# Patient Record
Sex: FEMALE | Race: WHITE | NOT HISPANIC OR LATINO | Employment: FULL TIME | URBAN - METROPOLITAN AREA
[De-identification: names, ages, dates, MRNs, and addresses within clinical notes are randomized per-mention and may not be internally consistent; named-entity substitution may affect disease eponyms.]

---

## 2018-03-16 ENCOUNTER — OFFICE VISIT (OUTPATIENT)
Dept: OBSTETRICS AND GYNECOLOGY | Facility: CLINIC | Age: 28
End: 2018-03-16

## 2018-03-16 VITALS
RESPIRATION RATE: 14 BRPM | SYSTOLIC BLOOD PRESSURE: 114 MMHG | WEIGHT: 191 LBS | BODY MASS INDEX: 30.83 KG/M2 | DIASTOLIC BLOOD PRESSURE: 72 MMHG

## 2018-03-16 DIAGNOSIS — Z01.419 WELL WOMAN EXAM WITH ROUTINE GYNECOLOGICAL EXAM: Primary | ICD-10-CM

## 2018-03-16 PROCEDURE — 99395 PREV VISIT EST AGE 18-39: CPT | Performed by: OBSTETRICS & GYNECOLOGY

## 2018-03-16 NOTE — PROGRESS NOTES
Subjective   Chief Complaint   Patient presents with   • Gynecologic Exam     Nunu Wells is a 27 y.o. year old  presenting to be seen for her annual exam.     SEXUAL Hx:  She is currently sexually active.  In the past year there there has been NO new sexual partners.    Condoms are never used.  She would not like to be screened for STD's at today's exam.  Current birth control method: not using any form of contraception because she is currently trying to conceive.  Been at it for ~ 13 months w/o success  MENSTRUAL Hx:  Patient's last menstrual period was 2018 (approximate).  In the past 6 months her cycles have been regular, predictable and occur monthly.  Her menstrual flow is typically normal.   Each month on average there are roughly 0 day(s) of very heavy flow.    Intermenstrual bleeding is absent.    Post-coital bleeding is absent.  Dysmenorrhea: is not affecting her activities of daily living  PMS: is not affecting her activities of daily living  Her cycles are not a source of concern for her that she wishes to discuss today.  HEALTH Hx:  She exercises regularly: no (and has no plans to become more active).  She wears her seat belt: yes.  She has concerns about domestic violence: no.  OTHER THINGS SHE WANTS TO DISCUSS TODAY:  Nothing else    The following portions of the patient's history were reviewed and updated as appropriate:problem list, current medications, allergies, past family history, past medical history, past social history and past surgical history.    Smoking status: Never Smoker                                                                 Smokeless tobacco: Not on file                       Review of Systems  Constitutional POS: nothing reported    NEG: anorexia or night sweats   Genitourinary POS: nothing reported    NEG: dysuria or hematuria      Gastointestinal POS: nothing reported    NEG: bloating, change in bowel habits, melena or reflux symptoms   Integument POS:  nothing reported    NEG: moles that are changing in size, shape, color or rashes   Breast POS: nothing reported    NEG: persistent breast lump, skin dimpling or nipple discharge        Objective   /72   Resp 14   Wt 86.6 kg (191 lb)   LMP 02/27/2018 (Approximate)   Breastfeeding? No   BMI 30.83 kg/m²     General:  well developed; well nourished  no acute distress   Skin:  No suspicious lesions seen   Thyroid: normal to inspection and palpation   Breasts:  Examined in supine position  Symmetric without masses or skin dimpling  Nipples normal without inversion, lesions or discharge  There are no palpable axillary nodes   Abdomen: soft, non-tender; no masses  no umbilical or inginual hernias are present  no hepato-splenomegaly   Pelvis: Clinical staff was present for exam  External genitalia:  normal appearance of the external genitalia including Bartholin's and [place]'s glands.  :  urethral meatus normal;  Vaginal:  normal pink mucosa without prolapse or lesions.  Cervix:  normal appearance.  Uterus:  normal size, shape and consistency.  Adnexa:  normal bimanual exam of the adnexa.  Rectal:  digital rectal exam not performed; anus visually normal appearing.        Assessment   1. Normal GYN exam  2. Pre-conceptional - she currently is taking sufficient folic acid  3. Primary infertility - She appears ovulatory by history today     Plan   1. Pap was not done today.  I explained to Nunu that the recommendations for Pap smear interval in a low risk patient has lengthened to 3 years time.  I told Nunu she still needs to be seen in our office yearly for a full physical including breast and pelvic exam.  2. I've encouraged Nunu to look into her health insurance coverage for infertility before determining whether to proceed with testing.  I reviewed with her the testing protocol that we would pursue if she wishes.  We would start initially with sperm count testing along with laboratory assessment of  ovulation.  If these tests were normal, would then consider proceeding with hysterosalpingogram and if this also was normal 5 lid proceeding to laparoscopy to evaluate for endometriosis.  3. No prescription was given or electronically sent at today's visit  4. The importance of keeping all planned follow-up and taking all medications as prescribed was emphasized.  5. Follow up for annual exam or PRN         This note was electronically signed.    Marco Dobbins M.D.  March 16, 2018    Note: Speech recognition transcription software may have been used to create portions of this document.  An attempt at proofreading has been made but errors in transcription could still be present.

## 2019-09-24 NOTE — PROGRESS NOTES
Subjective   Chief Complaint   Patient presents with   • Gynecologic Exam     Nunu Wells is a 29 y.o. year old  presenting to be seen for her annual exam.     SEXUAL Hx:  She is currently sexually active.  In the past year there there has been NO new sexual partners.    Condoms are never used.  She would not like to be screened for STD's at today's exam.  Current birth control method: not using any form of contraception because she is currently trying to conceive.  She is happy with her current method of contraception and does not want to discuss alternative methods of contraception.  MENSTRUAL Hx:  Patient's last menstrual period was 2019 (approximate).  In the past 6 months her cycles have been regular, predictable and occur monthly.  Her menstrual flow is typically normal.   Each month on average there are roughly 0 day(s) of very heavy flow.    Intermenstrual bleeding is absent.    Post-coital bleeding is absent.  Dysmenorrhea: is not affecting her activities of daily living  PMS: is not affecting her activities of daily living  Her cycles are not a source of concern for her that she wishes to discuss today.  HEALTH Hx:  She exercises regularly: no (and has no plans to become more active).  She wears her seat belt: yes.  She has concerns about domestic violence: no.  OTHER THINGS SHE WANTS TO DISCUSS TODAY:  Nothing else    The following portions of the patient's history were reviewed and updated as appropriate:problem list, current medications, allergies, past family history, past medical history, past social history and past surgical history.    Social History    Tobacco Use      Smoking status: Never Smoker      Smokeless tobacco: Never Used    Review of Systems  Constitutional POS: nothing reported    NEG: anorexia or night sweats   Genitourinary POS: nothing reported    NEG: dysuria or hematuria      Gastointestinal POS: nothing reported    NEG: bloating, change in bowel habits, melena or  reflux symptoms   Integument POS: nothing reported    NEG: moles that are changing in size, shape, color or rashes   Breast POS: nothing reported    NEG: persistent breast lump, skin dimpling or nipple discharge        Objective   /72   Resp 14   Wt 81.6 kg (180 lb)   LMP 08/27/2019 (Approximate)   Breastfeeding? No   BMI 29.05 kg/m²     General:  well developed; well nourished  no acute distress   Skin:  No suspicious lesions seen   Thyroid: normal to inspection and palpation   Breasts:  Examined in supine position  Symmetric without masses or skin dimpling  Nipples normal without inversion, lesions or discharge  There are no palpable axillary nodes  Ecchymosis and bruising along with hematomas present in the right breast consistent with recent surgery   Abdomen: soft, non-tender; no masses  no umbilical or inguinal hernias are present  no hepato-splenomegaly   Pelvis: Clinical staff was present for exam  External genitalia:  normal appearance of the external genitalia including Bartholin's and [place]'s glands.  :  urethral meatus normal;  Vaginal:  normal pink mucosa without prolapse or lesions.  Cervix:  normal appearance.  Uterus:  normal size, shape and consistency.  Adnexa:  normal bimanual exam of the adnexa.  Rectal:  digital rectal exam not performed; anus visually normal appearing.        Assessment   1. Normal GYN exam  2. Family history of breast cancer with negative genetic testing from maternal side.  Paternal side has not been worked up  3. Recent history of ductal hyperplasia.  Pathology reports not available.  If truly has atypical ductal hyperplasia confirmed by pathology this increases her risk to greater than 20%.  If that is the case she will need to have high risk protocol independent of genetic testing report  4. Pre-conceptional - she currently is taking sufficient folic acid     Plan   1. Pap was done today.  If she does not receive the results of the Pap within 2 weeks  time,  she was instructed to call to find out the results.  I explained to Nunu that the recommendations for Pap smear interval in a low risk patient's has lengthened to 3 years time.  I encouraged her to be seen yearly for a full physical exam including breast and pelvic exam even during the off years when PAP's will not be performed.  2. Set her up for genetic counseling here at [facility].  Have strongly encouraged her to get a copy of her pathology reports from her biopsies along with her mother's genetic testing results in advance of seeing the counselors at [facility]  3. The following data needs to be obtained to update her medical records: pathology from her breast biopsy.  4. The importance of keeping all planned follow-up and taking all medications as prescribed was emphasized.  5. Follow up after sees genetic counselors          This note was electronically signed.    Marco Dobbins M.D.  September 25, 2019    Note: Speech recognition transcription software may have been used to create portions of this document.  An attempt at proofreading has been made but errors in transcription could still be present.

## 2019-09-25 ENCOUNTER — OFFICE VISIT (OUTPATIENT)
Dept: OBSTETRICS AND GYNECOLOGY | Facility: CLINIC | Age: 29
End: 2019-09-25

## 2019-09-25 VITALS
BODY MASS INDEX: 29.05 KG/M2 | WEIGHT: 180 LBS | SYSTOLIC BLOOD PRESSURE: 118 MMHG | DIASTOLIC BLOOD PRESSURE: 72 MMHG | RESPIRATION RATE: 14 BRPM

## 2019-09-25 DIAGNOSIS — N60.91 ATYPICAL DUCTAL HYPERPLASIA OF RIGHT BREAST: ICD-10-CM

## 2019-09-25 DIAGNOSIS — Z01.419 WELL WOMAN EXAM WITH ROUTINE GYNECOLOGICAL EXAM: Primary | ICD-10-CM

## 2019-09-25 DIAGNOSIS — Z80.3 FAMILY HISTORY OF BREAST CANCER: ICD-10-CM

## 2019-09-25 PROCEDURE — 99395 PREV VISIT EST AGE 18-39: CPT | Performed by: OBSTETRICS & GYNECOLOGY

## 2019-10-28 ENCOUNTER — CLINICAL SUPPORT (OUTPATIENT)
Dept: GENETICS | Facility: HOSPITAL | Age: 29
End: 2019-10-28

## 2019-10-28 ENCOUNTER — CLINICAL SUPPORT (OUTPATIENT)
Dept: ONCOLOGY | Facility: CLINIC | Age: 29
End: 2019-10-28

## 2019-10-28 DIAGNOSIS — Z80.3 FAMILY HISTORY OF BREAST CANCER: Primary | ICD-10-CM

## 2019-10-28 DIAGNOSIS — Z80.41 FAMILY HISTORY OF OVARIAN CANCER: ICD-10-CM

## 2019-10-28 NOTE — PROGRESS NOTES
TELEMEDICINE    INDICATION:  Patient was seen for genetic counseling via telemedicine by Ni Escamilla [place], [facility] [place] history of [place] and ovarian [place].  Genetic counselor reviewed family history in detail, obtaining a three generation pedigree.   See pedigree and summary of genetic counseling session in genetic counselor’s visit documentation.      PLAN:  Genetic testing was sent for CANCER NEXT panel.  Results expected in three weeks.  Genetic counselor will contact patient with results at that time.      I reviewed recommendations with patient and genetic counselor at the conclusion of the visit.  I answered all remaining questions, and encouraged patient to contact genetic counselor if there are any further questions or concern.      Electronically Signed by: LISE Tierney , October 28, 2019 12:56 PM

## 2019-10-30 NOTE — PROGRESS NOTES
Nunu Wells, a 29-year-old female, was referred for genetic counseling due to a family history of breast cancer and ovarian cancer.  She reports having regular annual breast exams and annual mammograms.  She was 13 years old at menarche and is nulliparous.  She is premenopausal and retains her uterus and ovaries.  She was interested in discussing her risk for a hereditary cancer syndrome.  Ms. Wells was interested in pursuing comprehensive genetic testing to evaluate her risk of cancer, therefore the CancerNext panel was ordered through [employer], which analyzes 34 genes associated with an increased cancer risk. The genes on this panel include APC, KEYSHA, BARD1, BMPR1A, BRCA1, BRCA2, BRIP1, CDH1, CDK4, CDKN2A, CHEK2, DICER1, EPCAM, GREM1, HOXB13, MLH1, MRE11A, MSH2, MSH6, MUTYH, NBN, NF1, PALB2, PMS2, POLD1, POLE, PTEN, RAD50, RAD51C, RAD51D, SMAD4, SMARCA4, STK11, and TP53. Results are expected in approximately 2-3 weeks.     PERTINENT FAMILY HISTORY: (See attached pedigree)   Mat. Grandmother: Ovarian cancer, 71     Breast cancer  Mat. Great-Aunts (x3): Breast cancer  Mother:   negative BRCA1/2 - no panel completed  Pat. Grandmother: Ovarian cancer  Pat. Great-grandmother: Breast cancer     We do not have medical records regarding any of these diagnoses.      RISK ASSESSMENT:  Ms. Wells’s family history of breast cancer and ovarian cancer raised the question of a hereditary cancer syndrome. Ms. Wells clearly meets NCCN guidelines criteria for BRCA1/2 testing, on both sides of the family, based on having a close relative diagnosed with ovarian cancer.  When affected relatives are unavailable to pursue genetic testing, it is reasonable to offer to an unaffected individual.  Based on the presence of other clinically significant breast cancer related genes, the standard approach to hereditary cancer genetic testing at this time is via multi-gene panel, which evaluates for BRCA1/2 as well as several additional  genes associated with a hereditary risk for breast cancer and other cancers. If genetic testing is negative, management should be guided by a family history-based risk assessment.    GENETIC COUNSELING:  We reviewed the family history information in detail. Cases of cancer follow three general patterns: sporadic, familial, and hereditary.  While most cancer is sporadic, some cases appear to occur in family clusters.  These cases are said to be familial and account for 10-20% of cancer cases.  Familial cases may be due to a combination of shared genes and environmental factors among family members.  In even fewer families, the cancer is said to be inherited, and the genes responsible for the cancer are known.      Family histories typical of hereditary cancer syndromes usually include multiple first- and second-degree relatives diagnosed with cancer types that define a syndrome.  These cases tend to be diagnosed at younger-than-expected ages and can be bilateral or multifocal.  The cancer in these families follows an autosomal dominant inheritance pattern, which indicates the likely presence of a mutation in a cancer susceptibility gene.  Children and siblings of an individual believed to carry this mutation have a 50% chance of inheriting that mutation, thereby inheriting the increased risk to develop cancer.  These mutations can be passed down from the maternal or the paternal lineage.    Hereditary breast cancer accounts for 5-10% of all cases of breast cancer.  A significant proportion (50%) of hereditary breast cancer can be attributed to mutations in the BRCA1 and BRCA2 genes.  Mutations in these genes confer an increased risk for breast cancer, ovarian cancer, male breast cancer, prostate cancer, and pancreatic cancer.  Women with a BRCA1 or BRCA2 mutation have up to an 87% lifetime risk of breast cancer and up to a 44% risk of ovarian cancer.  There are other clinically significant breast cancer related genes  in addition to BRCA1/2, including PALB2, KEYSHA, and CHEK2. There are other, more rare, hereditary cancer syndromes. Based on Ms. Wells’s family history and her desire to get more information regarding her personal risks, she opted to pursue testing through a panel evaluating several other genes known to increase the risk for cancer.    GENETIC TESTING:  The risks, benefits, and limitations of genetic testing and implications for clinical management following testing were reviewed.  DNA test results can influence decisions regarding screening and prevention.  Genetic testing can have significant psychological implications for both individuals and families. Also discussed was the possibility of employment and insurance discrimination based on genetic test results and the laws in place to prevent this, as well as the limitations of these laws.      We discussed panel testing, which would involve testing 34 genes associated with increased cancer risk. The implications of a positive or negative test result were discussed.  We also discussed the importance of testing on an affected relative. In general, a negative genetic test result is most informative if a mutation has first been established in an affected member of the family.  In cases where an affected individual is not available or interested in testing, it is appropriate to offer testing to an unaffected individual. We discussed the possibility that, in some cases, genetic test results may be ambiguous due to the identification of a genetic variant. These variants may or may not be associated with an increased cancer risk. With multigene panel testing, it is not uncommon for a variant of uncertain significance (VUS) to be identified.  If a VUS is identified, testing family members is typically not recommended and screening recommendations are made based on the family history.  The laboratories that perform genetic testing work to reclassify the VUS and send out an  amended report if and when a VUS is reclassified.  The majority of variant findings are ultimately reclassified to a negative result. Given her family history, a negative test result does not eliminate all cancer risk, although the risk would not be as high as it would with positive genetic testing. We also discussed that some of the genes on this particular panel have not been well studied yet and there may not be clear implications or guidelines for some of the genes included on this comprehensive panel.    PLAN: Genetic testing via the CancerNext panel through [employer] was ordered and results are expected in 2-3 weeks.  [employer] will preauthorize the testing with Ms. Wells’s insurance and will then contact her if there is an out of pocket cost.  In most cases, testing is covered by insurance when the patient meets NCCN guidelines criteria.   We will contact Ms. Wells with her results once they are received.     Ni Escamilla MS, [facility] [place] [facility]  Licensed Certified Genetic Counselor

## 2019-11-25 ENCOUNTER — DOCUMENTATION (OUTPATIENT)
Dept: GENETICS | Facility: HOSPITAL | Age: 29
End: 2019-11-25

## 2019-11-25 PROBLEM — Z80.3 FAMILY HISTORY OF BREAST CANCER: Status: ACTIVE | Noted: 2019-11-25

## 2019-11-25 NOTE — PROGRESS NOTES
Nunu Wells, a 29-year-old female, was referred for genetic counseling due to a family history of breast cancer and ovarian cancer.  She reports having regular annual breast exams and has had mammograms in the past.  She had a breast biopsy recently, which identified usual ductal hyperplasia (path report available for review), no known history of atypical ductal hyperplasia.  She was 13 years old at menarche and is nulliparous.  She is premenopausal and retains her uterus and ovaries.  She was interested in discussing her risk for a hereditary cancer syndrome.  Ms. Wells was interested in pursuing comprehensive genetic testing to evaluate her risk of cancer, therefore the CancerNext panel was ordered through [facility], which analyzes 34 genes associated with an increased cancer risk. The genes on this panel include APC, KEYSHA, BARD1, BMPR1A, BRCA1, BRCA2, BRIP1, CDH1, CDK4, CDKN2A, CHEK2, DICER1, EPCAM, GREM1, HOXB13, MLH1, MRE11A, MSH2, MSH6, MUTYH, NBN, NF1, PALB2, PMS2, POLD1, POLE, PTEN, RAD50, RAD51C, RAD51D, SMAD4, SMARCA4, STK11, and TP53. Genetic testing was negative for pathogenic mutations in BRCA1/2 and 32 additional genes included on the CancerNext panel. These normal results were discussed with Ms. Wells by telephone on 11/25/19.    PERTINENT FAMILY HISTORY: (See attached pedigree)   Mat. Grandmother:  Possible Ovarian cancer, 71      Breast cancer  Mat. Great-Aunts (x3) : Breast cancer  Mother:   negative BRCA1/2 - no panel completed  Pat. Grandmother:  Ovarian cancer  Pat. Great-grandmother:  Breast cancer     We do not have medical records regarding any of these diagnoses.      RISK ASSESSMENT:  Ms. Wells’s family history of breast cancer and ovarian cancer raised the question of a hereditary cancer syndrome. Ms. Wells clearly meets NCCN guidelines criteria for BRCA1/2 testing, on both sides of the family, based on having a close relative diagnosed with ovarian cancer.  When affected  relatives are unavailable to pursue genetic testing, it is reasonable to offer to an unaffected individual.  Based on the presence of other clinically significant breast cancer related genes, the standard approach to hereditary cancer genetic testing at this time is via multi-gene panel, which evaluates for BRCA1/2 as well as several additional genes associated with a hereditary risk for breast cancer and other cancers. If genetic testing is negative, management should be guided by a family history-based risk assessment.    Because genetic testing was negative, Ms. Wells’s management should be guided by a family history-based risk assessment at this time. Simon, is able to take into account personal factors (age at menarche, age at first birth, menopausal status, hormone replacement, etc) and family history when calculating risk for breast cancer.  Computer modeling estimates that Ms. Wells’s lifetime personal risk for developing breast cancer is up to 16.6% (Simno, v8), in comparison to the general population risk of 12.5.  Her ten-year risk is estimated to be 0.6%, elevated above the average risk of 0.4%. Per NCCN guidelines, a lifetime risk above 20% is considered to be “high risk” where increased screening is warranted; Ms. Wells’s risk does not fall into that category. This risk assessment is based on the information provided at the time of the appointment and could change in the future should new information be obtained.    GENETIC COUNSELING:  We reviewed the family history information in detail. Cases of cancer follow three general patterns: sporadic, familial, and hereditary.  While most cancer is sporadic, some cases appear to occur in family clusters.  These cases are said to be familial and account for 10-20% of cancer cases.  Familial cases may be due to a combination of shared genes and environmental factors among family members.  In even fewer families, the cancer is said to be  inherited, and the genes responsible for the cancer are known.      Family histories typical of hereditary cancer syndromes usually include multiple first- and second-degree relatives diagnosed with cancer types that define a syndrome.  These cases tend to be diagnosed at younger-than-expected ages and can be bilateral or multifocal.  The cancer in these families follows an autosomal dominant inheritance pattern, which indicates the likely presence of a mutation in a cancer susceptibility gene.  Children and siblings of an individual believed to carry this mutation have a 50% chance of inheriting that mutation, thereby inheriting the increased risk to develop cancer.  These mutations can be passed down from the maternal or the paternal lineage.    Hereditary breast cancer accounts for 5-10% of all cases of breast cancer.  A significant proportion (50%) of hereditary breast cancer can be attributed to mutations in the BRCA1 and BRCA2 genes.  Mutations in these genes confer an increased risk for breast cancer, ovarian cancer, male breast cancer, prostate cancer, and pancreatic cancer.  Women with a BRCA1 or BRCA2 mutation have up to an 87% lifetime risk of breast cancer and up to a 44% risk of ovarian cancer.  There are other clinically significant breast cancer related genes in addition to BRCA1/2, including PALB2, KEYSHA, and CHEK2. There are other, more rare, hereditary cancer syndromes. Based on Ms. Wells’s family history and her desire to get more information regarding her personal risks, she opted to pursue testing through a panel evaluating several other genes known to increase the risk for cancer.    GENETIC TESTING:  The risks, benefits, and limitations of genetic testing and implications for clinical management following testing were reviewed.  DNA test results can influence decisions regarding screening and prevention.  Genetic testing can have significant psychological implications for both individuals and  families. Also discussed was the possibility of employment and insurance discrimination based on genetic test results and the laws in place to prevent this, as well as the limitations of these laws.      We discussed panel testing, which would involve testing 34 genes associated with increased cancer risk. The implications of a positive or negative test result were discussed.  We also discussed the importance of testing on an affected relative. In general, a negative genetic test result is most informative if a mutation has first been established in an affected member of the family.  In cases where an affected individual is not available or interested in testing, it is appropriate to offer testing to an unaffected individual. We discussed the possibility that, in some cases, genetic test results may be ambiguous due to the identification of a genetic variant. These variants may or may not be associated with an increased cancer risk. With multigene panel testing, it is not uncommon for a variant of uncertain significance (VUS) to be identified.  Given her family history, a negative test result does not eliminate all cancer risk, although the risk would not be as high as it would with positive genetic testing.     TEST RESULTS: Genetic testing was negative for known pathogenic mutations by sequencing and rearrangement testing for the genes on the CancerNext panel (see attached results).  This negative result greatly lowers the risk of a hereditary cancer syndrome for Ms. Wells.  It is possible that the family history is due to a hereditary cancer syndrome that Ms. Wells did not happen to inherit. If a relative of Ms. Wells were to have testing and was found to carry a mutation in a gene included on this panel in the future, then her risk assessment would need to be updated. This assessment is based on the information provided at the time of the consultation.    CANCER PREVENTION:  Options available to individuals  with an elevated lifetime risk for breast and/or ovarian cancer were briefly discussed, including increased surveillance, chemoprevention and prophylactic surgery (mastectomy and/or oophorectomy).  Based on computer modeling, Ms. Wells’s lifetime risk for breast cancer would not be considered “high risk”. Per NCCN guidelines, it is appropriate for her to follow general population screening guidelines for her breast cancer risk, including self-breast exams, annual clinical breast exam, and annual mammography starting at age 40.     PLAN:  Genetic counseling remains available to Ms. Wells. If she has any questions or concerns, she is welcome to contact us at [phone].    Ni Escamilla MS, [facility] [place]  Licensed Certified Genetic Counselor    Cc: MD Nunu Stephen

## 2022-05-03 ENCOUNTER — OFFICE VISIT (OUTPATIENT)
Dept: OBSTETRICS AND GYNECOLOGY | Facility: CLINIC | Age: 32
End: 2022-05-03

## 2022-05-03 VITALS
SYSTOLIC BLOOD PRESSURE: 116 MMHG | BODY MASS INDEX: 31.8 KG/M2 | WEIGHT: 197 LBS | RESPIRATION RATE: 14 BRPM | DIASTOLIC BLOOD PRESSURE: 76 MMHG

## 2022-05-03 DIAGNOSIS — Z71.85 VACCINE COUNSELING: ICD-10-CM

## 2022-05-03 DIAGNOSIS — Z01.419 WELL WOMAN EXAM WITH ROUTINE GYNECOLOGICAL EXAM: Primary | ICD-10-CM

## 2022-05-03 DIAGNOSIS — Z80.3 FAMILY HISTORY OF BREAST CANCER: ICD-10-CM

## 2022-05-03 PROCEDURE — 99395 PREV VISIT EST AGE 18-39: CPT | Performed by: OBSTETRICS & GYNECOLOGY

## 2022-05-04 ENCOUNTER — TELEPHONE (OUTPATIENT)
Dept: OBSTETRICS AND GYNECOLOGY | Facility: CLINIC | Age: 32
End: 2022-05-04

## 2022-05-04 DIAGNOSIS — N97.9 FEMALE INFERTILITY: Primary | ICD-10-CM

## 2022-05-04 NOTE — TELEPHONE ENCOUNTER
I placed orders for labs.  These labs need to be done specifically at a time of her menstrual cycle.  If day 1 is the first day of bleeding, these test need to be done around day 21.  Preferably this should also be done fasting first thing in the morning.    Also please give her the phone number for Dr. Ean Garibay.  His phone number is [phone].  Her spouse needs to call him to get a semen analysis done.

## 2022-05-04 NOTE — TELEPHONE ENCOUNTER
Mu    Pt was seen on 5/3/22. Pt called insurance to get info on fertility coverage states she was notified they cover testing but not treatments. Pt wants orders put in for Fertility testing.

## 2022-05-04 NOTE — TELEPHONE ENCOUNTER
Spoke with Nunu advising her of new ordered lab work for pt and her spouse.  Pt lives 2+ hours from [place] so orders have been faxed to   [phone]  [facility] for pt convenience. Pt voiced understanding of instructions.

## 2022-05-06 LAB — REF LAB TEST METHOD: NORMAL

## 2022-05-31 ENCOUNTER — TELEPHONE (OUTPATIENT)
Dept: OBSTETRICS AND GYNECOLOGY | Facility: CLINIC | Age: 32
End: 2022-05-31

## 2022-05-31 NOTE — TELEPHONE ENCOUNTER
Dr. Dobbins' patient   [phone] returned patient's call, recording: your call cannot be completed at this time.   [phone] patient called back and I advised her her blood work is not back yet. Patient states she has her results in her [facility] portal. I advised patient to fax or upload her results to her [facility] if she can. Patient is going to fax them over to [phone].

## 2022-06-01 ENCOUNTER — DOCUMENTATION (OUTPATIENT)
Dept: OBSTETRICS AND GYNECOLOGY | Facility: CLINIC | Age: 32
End: 2022-06-01

## 2022-06-01 NOTE — PROGRESS NOTES
Outside lab reviewed    May 30, 2022  · TSH = 1.521  · Prolactin = 18.86  · Progesterone = 4.94      Marco Dobbins M.D.  June 1, 2022  08:54 EDT

## 2022-12-27 ENCOUNTER — TELEPHONE (OUTPATIENT)
Dept: OBSTETRICS AND GYNECOLOGY | Facility: CLINIC | Age: 32
End: 2022-12-27

## 2022-12-27 NOTE — TELEPHONE ENCOUNTER
PAUL HARP    [phone]    PT IS 8wks HAS NOT STARTED OB CARE     WAS SEEN 12/24/22 [facility] ER FOR BLEEDING     PLS GIVE PT A CALL ON F/U

## 2022-12-27 NOTE — TELEPHONE ENCOUNTER
Called patient and informed of Dr. Dobbins' advisement.  She verified understanding.  Offered her NOB with US on 1/9, she opted to wait for 1/12 so that her  could come as well.  She will let us know if bleeding worsens or changes.  She confirmed that currently spotting is very light.

## 2022-12-27 NOTE — TELEPHONE ENCOUNTER
If her bleeding is light no emergency to be seen.  Would be great to get her in sometime within the next 2 weeks for a follow-up ultrasound.  If the bleeding becomes heavy she does need to be seen more urgently.

## 2022-12-27 NOTE — TELEPHONE ENCOUNTER
"Called and spoke with patient.  Patient states that started having lighter bleeding on Sunday December 18th and went to an ED (not ).  She states that she most recently had some more moderate/flow of bleeding on 12/24, with this she went to ED in [place], [place].  She states that at this visit they said it was due to a subchorionic hemorrhage and administered a Rhogam shot and instructed her to see Dr. Dobbins.  Patient also states that she has a Bicornuate Uterus and that the pregnancy is in the left side.  She states that since her 12/24 ED visit, the bleeding/spotting has been light and on and off but has not stopped.  Patient is having records from most recent ED visit faxed over to us (I provided fax number).  Routing this to physician to see in what time frame he would like us to get patient scheduled within if prior to first technical \"first available\" NOB and US.   "

## 2022-12-28 PROCEDURE — 99284 EMERGENCY DEPT VISIT MOD MDM: CPT

## 2022-12-29 ENCOUNTER — HOSPITAL ENCOUNTER (EMERGENCY)
Facility: HOSPITAL | Age: 32
Discharge: HOME | End: 2022-12-29
Attending: STUDENT IN AN ORGANIZED HEALTH CARE EDUCATION/TRAINING PROGRAM | Admitting: STUDENT IN AN ORGANIZED HEALTH CARE EDUCATION/TRAINING PROGRAM
Payer: COMMERCIAL

## 2022-12-29 ENCOUNTER — APPOINTMENT (OUTPATIENT)
Dept: ULTRASOUND IMAGING | Facility: HOSPITAL | Age: 32
End: 2022-12-29
Payer: COMMERCIAL

## 2022-12-29 VITALS
DIASTOLIC BLOOD PRESSURE: 71 MMHG | TEMPERATURE: 98.1 F | RESPIRATION RATE: 16 BRPM | OXYGEN SATURATION: 97 % | HEIGHT: 66 IN | WEIGHT: 203 LBS | BODY MASS INDEX: 32.62 KG/M2 | SYSTOLIC BLOOD PRESSURE: 123 MMHG | HEART RATE: 94 BPM

## 2022-12-29 DIAGNOSIS — O46.8X1 SUBCHORIONIC HEMATOMA IN FIRST TRIMESTER, SINGLE OR UNSPECIFIED FETUS: Primary | ICD-10-CM

## 2022-12-29 DIAGNOSIS — N28.89 PELVIECTASIS OF KIDNEY: ICD-10-CM

## 2022-12-29 DIAGNOSIS — M79.18 PAIN OF PARASPINAL MUSCLE: ICD-10-CM

## 2022-12-29 DIAGNOSIS — O41.8X10 SUBCHORIONIC HEMATOMA IN FIRST TRIMESTER, SINGLE OR UNSPECIFIED FETUS: Primary | ICD-10-CM

## 2022-12-29 DIAGNOSIS — N39.0 ACUTE UTI (URINARY TRACT INFECTION): ICD-10-CM

## 2022-12-29 DIAGNOSIS — M54.6 ACUTE RIGHT-SIDED THORACIC BACK PAIN: ICD-10-CM

## 2022-12-29 LAB
ABO GROUP BLD: NORMAL
ALBUMIN SERPL-MCNC: 4 G/DL (ref 3.5–5.2)
ALBUMIN/GLOB SERPL: 1.3 G/DL
ALP SERPL-CCNC: 58 U/L (ref 39–117)
ALT SERPL W P-5'-P-CCNC: 23 U/L (ref 1–33)
ANION GAP SERPL CALCULATED.3IONS-SCNC: 15 MMOL/L (ref 5–15)
AST SERPL-CCNC: 36 U/L (ref 1–32)
BACTERIA UR QL AUTO: ABNORMAL /HPF
BACTERIA UR QL AUTO: ABNORMAL /HPF
BASOPHILS # BLD AUTO: 0.04 10*3/MM3 (ref 0–0.2)
BASOPHILS NFR BLD AUTO: 0.3 % (ref 0–1.5)
BILIRUB SERPL-MCNC: 0.5 MG/DL (ref 0–1.2)
BILIRUB UR QL STRIP: NEGATIVE
BILIRUB UR QL STRIP: NEGATIVE
BUN SERPL-MCNC: 10 MG/DL (ref 6–20)
BUN/CREAT SERPL: 11.5 (ref 7–25)
CALCIUM SPEC-SCNC: 9.1 MG/DL (ref 8.6–10.5)
CHLORIDE SERPL-SCNC: 104 MMOL/L (ref 98–107)
CLARITY UR: ABNORMAL
CLARITY UR: CLEAR
CO2 SERPL-SCNC: 20 MMOL/L (ref 22–29)
COLOR UR: YELLOW
COLOR UR: YELLOW
CREAT SERPL-MCNC: 0.87 MG/DL (ref 0.57–1)
DEPRECATED RDW RBC AUTO: 41.8 FL (ref 37–54)
EGFRCR SERPLBLD CKD-EPI 2021: 90.9 ML/MIN/1.73
EOSINOPHIL # BLD AUTO: 0 10*3/MM3 (ref 0–0.4)
EOSINOPHIL NFR BLD AUTO: 0 % (ref 0.3–6.2)
ERYTHROCYTE [DISTWIDTH] IN BLOOD BY AUTOMATED COUNT: 12.8 % (ref 12.3–15.4)
GLOBULIN UR ELPH-MCNC: 3.1 GM/DL
GLUCOSE SERPL-MCNC: 108 MG/DL (ref 65–99)
GLUCOSE UR STRIP-MCNC: NEGATIVE MG/DL
GLUCOSE UR STRIP-MCNC: NEGATIVE MG/DL
HCG INTACT+B SERPL-ACNC: NORMAL MIU/ML
HCT VFR BLD AUTO: 43.2 % (ref 34–46.6)
HGB BLD-MCNC: 14 G/DL (ref 12–15.9)
HGB UR QL STRIP.AUTO: ABNORMAL
HGB UR QL STRIP.AUTO: ABNORMAL
HYALINE CASTS UR QL AUTO: ABNORMAL /LPF
HYALINE CASTS UR QL AUTO: ABNORMAL /LPF
IMM GRANULOCYTES # BLD AUTO: 0.1 10*3/MM3 (ref 0–0.05)
IMM GRANULOCYTES NFR BLD AUTO: 0.6 % (ref 0–0.5)
KETONES UR QL STRIP: ABNORMAL
KETONES UR QL STRIP: ABNORMAL
LEUKOCYTE ESTERASE UR QL STRIP.AUTO: ABNORMAL
LEUKOCYTE ESTERASE UR QL STRIP.AUTO: ABNORMAL
LIPASE SERPL-CCNC: 62 U/L (ref 13–60)
LYMPHOCYTES # BLD AUTO: 1.27 10*3/MM3 (ref 0.7–3.1)
LYMPHOCYTES NFR BLD AUTO: 8.2 % (ref 19.6–45.3)
MCH RBC QN AUTO: 28.7 PG (ref 26.6–33)
MCHC RBC AUTO-ENTMCNC: 32.4 G/DL (ref 31.5–35.7)
MCV RBC AUTO: 88.7 FL (ref 79–97)
MONOCYTES # BLD AUTO: 0.63 10*3/MM3 (ref 0.1–0.9)
MONOCYTES NFR BLD AUTO: 4.1 % (ref 5–12)
NEUTROPHILS NFR BLD AUTO: 13.44 10*3/MM3 (ref 1.7–7)
NEUTROPHILS NFR BLD AUTO: 86.8 % (ref 42.7–76)
NITRITE UR QL STRIP: NEGATIVE
NITRITE UR QL STRIP: NEGATIVE
NRBC BLD AUTO-RTO: 0 /100 WBC (ref 0–0.2)
PH UR STRIP.AUTO: 6 [PH] (ref 5–8)
PH UR STRIP.AUTO: 6 [PH] (ref 5–8)
PLATELET # BLD AUTO: 191 10*3/MM3 (ref 140–450)
PMV BLD AUTO: 11.3 FL (ref 6–12)
POTASSIUM SERPL-SCNC: 4.4 MMOL/L (ref 3.5–5.2)
PROT SERPL-MCNC: 7.1 G/DL (ref 6–8.5)
PROT UR QL STRIP: ABNORMAL
PROT UR QL STRIP: NEGATIVE
RBC # BLD AUTO: 4.87 10*6/MM3 (ref 3.77–5.28)
RBC # UR STRIP: ABNORMAL /HPF
RBC # UR STRIP: ABNORMAL /HPF
REF LAB TEST METHOD: ABNORMAL
REF LAB TEST METHOD: ABNORMAL
RH BLD: NEGATIVE
SODIUM SERPL-SCNC: 139 MMOL/L (ref 136–145)
SP GR UR STRIP: 1.02 (ref 1–1.03)
SP GR UR STRIP: 1.02 (ref 1–1.03)
SQUAMOUS #/AREA URNS HPF: ABNORMAL /HPF
SQUAMOUS #/AREA URNS HPF: ABNORMAL /HPF
UROBILINOGEN UR QL STRIP: ABNORMAL
UROBILINOGEN UR QL STRIP: ABNORMAL
WBC # UR STRIP: ABNORMAL /HPF
WBC # UR STRIP: ABNORMAL /HPF
WBC NRBC COR # BLD: 15.48 10*3/MM3 (ref 3.4–10.8)

## 2022-12-29 PROCEDURE — 81001 URINALYSIS AUTO W/SCOPE: CPT | Performed by: STUDENT IN AN ORGANIZED HEALTH CARE EDUCATION/TRAINING PROGRAM

## 2022-12-29 PROCEDURE — 76817 TRANSVAGINAL US OBSTETRIC: CPT

## 2022-12-29 PROCEDURE — 87086 URINE CULTURE/COLONY COUNT: CPT | Performed by: STUDENT IN AN ORGANIZED HEALTH CARE EDUCATION/TRAINING PROGRAM

## 2022-12-29 PROCEDURE — 84702 CHORIONIC GONADOTROPIN TEST: CPT | Performed by: STUDENT IN AN ORGANIZED HEALTH CARE EDUCATION/TRAINING PROGRAM

## 2022-12-29 PROCEDURE — 86901 BLOOD TYPING SEROLOGIC RH(D): CPT | Performed by: STUDENT IN AN ORGANIZED HEALTH CARE EDUCATION/TRAINING PROGRAM

## 2022-12-29 PROCEDURE — 80053 COMPREHEN METABOLIC PANEL: CPT | Performed by: STUDENT IN AN ORGANIZED HEALTH CARE EDUCATION/TRAINING PROGRAM

## 2022-12-29 PROCEDURE — 83690 ASSAY OF LIPASE: CPT | Performed by: STUDENT IN AN ORGANIZED HEALTH CARE EDUCATION/TRAINING PROGRAM

## 2022-12-29 PROCEDURE — 86900 BLOOD TYPING SEROLOGIC ABO: CPT | Performed by: STUDENT IN AN ORGANIZED HEALTH CARE EDUCATION/TRAINING PROGRAM

## 2022-12-29 PROCEDURE — 85025 COMPLETE CBC W/AUTO DIFF WBC: CPT | Performed by: STUDENT IN AN ORGANIZED HEALTH CARE EDUCATION/TRAINING PROGRAM

## 2022-12-29 PROCEDURE — 36415 COLL VENOUS BLD VENIPUNCTURE: CPT

## 2022-12-29 PROCEDURE — 76775 US EXAM ABDO BACK WALL LIM: CPT

## 2022-12-29 RX ORDER — LIDOCAINE 50 MG/G
1 PATCH TOPICAL
Status: DISCONTINUED | OUTPATIENT
Start: 2022-12-29 | End: 2022-12-29 | Stop reason: HOSPADM

## 2022-12-29 RX ORDER — ACETAMINOPHEN 500 MG
1000 TABLET ORAL ONCE
Status: COMPLETED | OUTPATIENT
Start: 2022-12-29 | End: 2022-12-29

## 2022-12-29 RX ORDER — LIDOCAINE 50 MG/G
1 PATCH TOPICAL EVERY 24 HOURS
Qty: 6 PATCH | Refills: 0 | Status: SHIPPED | OUTPATIENT
Start: 2022-12-29 | End: 2023-01-26

## 2022-12-29 RX ORDER — METHOCARBAMOL 750 MG/1
750 TABLET, FILM COATED ORAL 3 TIMES DAILY
Qty: 9 TABLET | Refills: 0 | Status: SHIPPED | OUTPATIENT
Start: 2022-12-29 | End: 2023-01-01

## 2022-12-29 RX ORDER — METHOCARBAMOL 750 MG/1
750 TABLET, FILM COATED ORAL ONCE
Status: COMPLETED | OUTPATIENT
Start: 2022-12-29 | End: 2022-12-29

## 2022-12-29 RX ORDER — CEFDINIR 300 MG/1
300 CAPSULE ORAL 2 TIMES DAILY
Qty: 14 CAPSULE | Refills: 0 | Status: SHIPPED | OUTPATIENT
Start: 2022-12-29 | End: 2023-01-05

## 2022-12-29 RX ADMIN — LIDOCAINE 1 PATCH: 50 PATCH CUTANEOUS at 04:59

## 2022-12-29 RX ADMIN — ACETAMINOPHEN 1000 MG: 500 TABLET, FILM COATED ORAL at 04:33

## 2022-12-29 RX ADMIN — METHOCARBAMOL 750 MG: 750 TABLET ORAL at 06:05

## 2022-12-29 NOTE — DISCHARGE INSTRUCTIONS
Try the provided pain patches and muscle relaxers in addition to the acetaminophen that you are already taking for what I think is a muscle strain.  As we discussed if you develop worsening pain, fevers, or any other concerning symptoms please return to the ED or seek other medical care.  Follow-up with your obstetrician.

## 2022-12-30 LAB — BACTERIA SPEC AEROBE CULT: NO GROWTH

## 2023-01-03 NOTE — ED PROVIDER NOTES
EMERGENCY DEPARTMENT ENCOUNTER    Pt Name: Nunu Morgan  MRN: [id number]  Pt :   1990  Room Number:  15/15  Date of encounter:  2022  PCP: Daquan Rahman DO  ED Provider: Peter Gonzalez MD    Historian: Patient      HPI:  Chief Complaint: Flank pain, vaginal spotting        Context: Nunu Morgan is a 32-year-old female who presents the emergency department because of severe right mid back pain in the setting of known subchorionic hemorrhage and possible hydronephrosis.  She says on Michell aditi 5 days ago she had significant vaginal bleeding.  She is 8 weeks pregnant follows with Dr. Dobbins.  She is also currently being treated with amoxicillin for urinary tract infection.  She was seen at an outside hospital where she was found to have subchorionic hemorrhage.  She was treated there with RhoGAM and instructed to follow-up.  Today she has severe right-sided flank pain and was found to have possible hydronephrosis on ultrasound so told to come here for evaluation.  Continues to describe her pain as severe gets worse with movement.  She is having associated dysuria as well.  No appreciated fevers or systemic symptoms.  No other complaints at this time.    PAST MEDICAL HISTORY  Past Medical History:   Diagnosis Date   • Anemia 2016    Sees PCP         PAST SURGICAL HISTORY  Past Surgical History:   Procedure Laterality Date   • APPENDECTOMY OPEN  2011   • BILATERAL BREAST REDUCTION Bilateral 2015   • BREAST LUMPECTOMY Right 2019    pathology showed ductal hyperplasia   • LAPAROSCOPIC CHOLECYSTECTOMY  2011         FAMILY HISTORY  Family History   Problem Relation Age of Onset   • Breast cancer Paternal Grandmother    • Breast cancer Maternal Grandmother 70   • Breast cancer Paternal Aunt          SOCIAL HISTORY  Social History     Socioeconomic History   • Marital status:    Tobacco Use   • Smoking status: Never   • Smokeless tobacco: Never   Substance and Sexual  Activity   • Alcohol use: No   • Drug use: No         ALLERGIES  Morphine and related and Promethazine        REVIEW OF SYSTEMS  Review of Systems       All systems reviewed and negative except for those discussed in HPI.       PHYSICAL EXAM    I have reviewed the triage vital signs and nursing notes.    ED Triage Vitals [12/28/22 2259]   Temp Heart Rate Resp BP SpO2   98.1 °F (36.7 °C) 94 16 159/99 97 %      Temp src Heart Rate Source Patient Position BP Location FiO2 (%)   Oral Monitor Sitting Left arm --       Physical Exam  GENERAL:   Appears uncomfortable but in no acute distress  HENT: Nares patent.  EYES: No scleral icterus.  CV: Regular rhythm, regular rate.  RESPIRATORY: Normal effort.  No audible wheezes, rales or rhonchi.  ABDOMEN: Soft, nontender  MUSCULOSKELETAL: No deformities.  No actual CVA tenderness but she does have tenderness and palpable muscle spasm over the right mid paraspinal muscles  NEURO: Alert, moves all extremities, follows commands.  SKIN: Warm, dry, no rash visualized.      LAB RESULTS  No results found for this or any previous visit (from the past 24 hour(s)).    If labs were ordered, I independently reviewed the results and considered them in treating the patient.        RADIOLOGY  No Radiology Exams Resulted Within Past 24 Hours    I ordered and independently reviewed the above noted radiographic studies.      I viewed images of transvaginal ultrasound which shows intrauterine pregnancy with present fetal heart tones formal over read appreciates at 8 weeks 2 days.  Good blood flow to the bilateral ovaries.  Renal ultrasound which shows pelviectasis versus very mild hydronephrosis.  See radiologist's dictation for official interpretation.        PROCEDURES    Procedures    No orders to display       MEDICATIONS GIVEN IN ER    Medications   acetaminophen (TYLENOL) tablet 1,000 mg (1,000 mg Oral Given 12/29/22 0433)   methocarbamol (ROBAXIN) tablet 750 mg (750 mg Oral Given 12/29/22  0605)         MEDICAL DECISION MAKING, PROGRESS, and CONSULTS    All labs have been independently reviewed by me.  All radiology studies have been reviewed by me and the radiologist dictating the report.  All EKG's have been independently viewed and interpreted by me.      Discussion below represents my analysis of pertinent findings related to patient's condition, differential diagnosis, treatment plan and final disposition.      Differential diagnosis:    Subchorionic hemorrhage, threatened , spontaneous , kidney stone, pyelonephritis, ovarian torsion, anemia, electrolyte abnormality      Additional sources:        - External (non-ED) record review: Previous OB notes with Dr. Dobbins          Orders placed during this visit:  Orders Placed This Encounter   Procedures   • Urine Culture - Urine,   • US Ob Transvaginal   • US Renal Limited   • Comprehensive Metabolic Panel   • hCG, Quantitative, Pregnancy   • Lipase   • Urinalysis With Microscopic If Indicated (No Culture) - Urine, Clean Catch   • CBC Auto Differential   • Urinalysis, Microscopic Only - Urine, Clean Catch   • Urinalysis With Microscopic If Indicated (No Culture) - Urine, Clean Catch   • Urinalysis, Microscopic Only - Urine, Clean Catch   • ABO / Rh   • CBC & Differential         Additional orders considered but not ordered:  CT scan of the abdomen pelvis.  There was concern at the outside hospital for possible hydronephrosis and she is describing flank pain however has tenderness to palpation over the paraspinal muscles I think this is musculoskeletal pain.  Formal renal ultrasound is within normal limits for pregnancy and pelviectasis and she is afebrile we discussed deferring CT scan due to her pregnancy but if she develops worsening flank pain, fevers, or any other concerning symptoms will return to the ED immediately.    ED Course:    Consultants:      ED Course as of 23 1142   Thu Dec 29, 2022   0419 This is a 32-year-old  female who presents the emergency department because of severe right mid back pain in the setting of known subchorionic hemorrhage and possible hydronephrosis.  She says on Michell aditi 5 days ago she had significant vaginal bleeding.  She is 8 weeks pregnant follows with Dr. Dobbins.  She is also currently being treated with amoxicillin for urinary tract infection.  She was seen at an outside hospital where she was found to have subchorionic hemorrhage.  She was treated there with RhoGAM and instructed to follow-up.  Today she has severe right-sided flank pain and was found to have possible hydronephrosis on ultrasound so told to come here for evaluation.  Continues to describe her pain as severe gets worse with movement.  She is having associated dysuria as well.  No appreciated fevers or systemic symptoms.  No other complaints at this time. [CC]      ED Course User Index  [CC] Peter Gonzalez MD     She arrived awake alert initially mildly hypertensive in triage this with intervention.  She has tenderness to palpation over the right paraspinal muscles I think this may be musculoskeletal pain.  Treated with acetaminophen and a Lidoderm patch.  Labs do reveal leukocytosis of 15,000.  Very mildly elevated lipase at 62.  Urinalysis shows small leuks, hematuria, and 3-5 white cells but is also contaminated by some squamous cells.  Urine culture has been sent.  She is Rh- but already received RhoGAM at the outside hospital.  She is afebrile.  The concern is does she have pyelonephritis or renal obstruction as there was concern for hydronephrosis at the outside.  She does not have either very mild hydro or normal pelviectasis on formal renal ultrasound.  Transvaginal ultrasound shows the known subchorionic hematoma, good blood flow to the bilateral ovaries.  She has an 8-week 2-day intrauterine pregnancy with fetal heart rate of 175.  Discussed the findings with Dr. Sen the on-call obstetrician for   Mu who is reassured by the findings.  We will switch her antibiotic to cefdinir.  She will follow-up with OB in the morning.  We discussed CT scan but because of her general abnormal ultrasound findings will defer as long as she does not develop worsening pain, fevers, or any other concerning symptoms.  Follow-up with OB.  Counseled on strict return precautions.             AS OF 11:42 EST VITALS:    BP - 123/71  HR - 94  TEMP - 98.1 °F (36.7 °C) (Oral)  O2 SATS - 97%                  DIAGNOSIS  Final diagnoses:   Subchorionic hematoma in first trimester, single or unspecified fetus   Acute UTI (urinary tract infection)   Pelviectasis of kidney   Acute right-sided thoracic back pain   Pain of paraspinal muscle         DISPOSITION  DISCHARGE    Patient discharged in stable condition.    Reviewed implications of results, diagnosis, meds, responsibility to follow up, warning signs and symptoms of possible worsening, potential complications and reasons to return to ER.    Patient/Family voiced understanding of above instructions.    Discussed plan for discharge, as there is no emergent indication for admission.  Pt/family is agreeable and understands need for follow up and possible repeat testing.  Pt/family is aware that discharge does not mean that nothing is wrong but that it indicates no emergency is currently present that requires admission and they must continue care with follow-up as given below or with a physician of their choice.     FOLLOW-UP  Marco Dobbins MD  [address] [place] [address]  [phone]    Call   Let them know you were seen in the emergency department.         Medication List      New Prescriptions    cefdinir 300 MG capsule  Commonly known as: OMNICEF  Take 1 capsule by mouth 2 (Two) Times a Day for 7 days.     lidocaine 5 %  Commonly known as: LIDODERM  Place 1 patch on the skin as directed by provider Daily. Remove & Discard patch within 12 hours or as  directed by MD        ASK your doctor about these medications    methocarbamol 750 MG tablet  Commonly known as: ROBAXIN  Take 1 tablet by mouth 3 (Three) Times a Day for 3 days.  Ask about: Should I take this medication?           Where to Get Your Medications      These medications were sent to [facility]/pharmacy #[id number] - [place], [place] - [address] - [phone]  - [phone] FX  [address] [place] [address]    Phone: [phone]   · cefdinir 300 MG capsule  · lidocaine 5 %  · methocarbamol 750 MG tablet             Please note that portions of this document were completed with voice recognition software.        Peter Gonzalez MD  01/03/23 [id number]

## 2023-01-04 ENCOUNTER — TELEPHONE (OUTPATIENT)
Dept: OBSTETRICS AND GYNECOLOGY | Facility: CLINIC | Age: 33
End: 2023-01-04
Payer: COMMERCIAL

## 2023-01-04 NOTE — TELEPHONE ENCOUNTER
Patient called back in and was informed of results as described and provided by Dr. Dobbins.  She verified understanding.  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  She continues to have great concern in regards to her work.  She feels like she is limited to 3-4 hours working on the floor with patients before she has issues with pain and bleeding.  There is the option of supervising which is more or less desk work where she feels like she is not having these same issues.  She would still like to know if there is anyway to advise just to do that more desk-forward portion or limit time on floor.

## 2023-01-04 NOTE — TELEPHONE ENCOUNTER
Called informed patient of response, she verified understanding and will look out for call later today in regards to these answers.

## 2023-01-04 NOTE — TELEPHONE ENCOUNTER
----- Message from Marco Dobbins MD sent at 1/4/2023 10:50 AM EST -----  Call her after reviewing the ultrasound images

## 2023-01-04 NOTE — TELEPHONE ENCOUNTER
Please call Nunu and let her know I had a chance to review her ultrasound images.  There is a little bit of blood in the top right corner of the uterus but everything else looks fine.  At this point other than avoiding intercourse I do not think there are any restrictions that are necessary.  Limiting work activity should not increase the probability of miscarriage.    Also you can let her know that I reviewed her uterus given that she reported to the ultrasound tech she has a history of a bicornate uterus.  She does not have a bicornuate uterus from the images.  The uterine shape overall is normal.  There is no evidence of a divided uterine cavity.

## 2023-01-04 NOTE — LETTER
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 She also should be restricted to  working no more than 8 hours in a single day.  Currently she is pregnant with a gestational age of 9w3d these restrictions can be lifted when she is 12w0d.  At that point she will have no restrictions as a relates to work assuming she has no further unanticipated problems in her pregnancy.  This would correspond to January 24, 2023.    If you have any questions please do not hesitate to call.       Sincerely,        Marco Dobbins MD

## 2023-01-04 NOTE — TELEPHONE ENCOUNTER
Let her know I am not seeing the ultrasound report yet.  It should be read later today.  Also let her know ongoing Lebder the office after surgery and look at the images myself.  I will get back ahold of her after reviewing those images.

## 2023-01-04 NOTE — TELEPHONE ENCOUNTER
gunnar    Pt called stating that she was supposed to get a call with what her restrictions for work. 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 Please advise.

## 2023-01-05 NOTE — TELEPHONE ENCOUNTER
Called attempting to reach patient; no answer, unable to LVM requesting call back (phone line did not offer VM box).

## 2023-01-05 NOTE — TELEPHONE ENCOUNTER
You can let her know I am willing to put her on limited activity for the next 3 weeks through the end of the first trimester.  If by the end of the first trimester things are continuing to go well, I do not think she will need to be on limited activity any further.  I do not think there is any data to show that working beyond the first trimester in a patient with bleeding increases the chance of problems.  If this is something she would like me to do on how to dictate that letter.

## 2023-01-05 NOTE — TELEPHONE ENCOUNTER
Called attempting to reach patient; no answer, LVM requesting call back and provided office call back number.

## 2023-01-05 NOTE — TELEPHONE ENCOUNTER
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 She would also like a restriction of working no more than 8 hours in a single day.     Routing to physician for review/advisement.

## 2023-01-06 ENCOUNTER — TELEPHONE (OUTPATIENT)
Dept: OBSTETRICS AND GYNECOLOGY | Facility: CLINIC | Age: 33
End: 2023-01-06
Payer: COMMERCIAL

## 2023-01-06 NOTE — TELEPHONE ENCOUNTER
Let her know the letter has been created.  It can either be faxed or mailed, what ever would be more appropriate for her.

## 2023-01-07 PROBLEM — Z34.80 SUPERVISION OF NORMAL INTRAUTERINE PREGNANCY IN MULTIGRAVIDA: Status: ACTIVE | Noted: 2023-01-07

## 2023-01-09 NOTE — TELEPHONE ENCOUNTER
Called and informed patient that letter was complete, she has seen it on MyChart and has gotten it to her boss already.  No further action needed.    Size Of Margin In Cm: 0.4

## 2023-01-12 ENCOUNTER — INITIAL PRENATAL (OUTPATIENT)
Dept: OBSTETRICS AND GYNECOLOGY | Facility: CLINIC | Age: 33
End: 2023-01-12
Payer: COMMERCIAL

## 2023-01-12 VITALS — DIASTOLIC BLOOD PRESSURE: 70 MMHG | SYSTOLIC BLOOD PRESSURE: 122 MMHG | BODY MASS INDEX: 32.28 KG/M2 | WEIGHT: 200 LBS

## 2023-01-12 DIAGNOSIS — Z71.85 VACCINE COUNSELING: ICD-10-CM

## 2023-01-12 DIAGNOSIS — Z34.81 SUPERVISION OF NORMAL INTRAUTERINE PREGNANCY IN MULTIGRAVIDA IN FIRST TRIMESTER: Primary | ICD-10-CM

## 2023-01-12 PROBLEM — O26.899 RH NEGATIVE STATUS DURING PREGNANCY: Status: ACTIVE | Noted: 2023-01-12

## 2023-01-12 PROBLEM — Z67.91 RH NEGATIVE STATUS DURING PREGNANCY: Status: ACTIVE | Noted: 2023-01-12

## 2023-01-12 PROCEDURE — 0501F PRENATAL FLOW SHEET: CPT | Performed by: OBSTETRICS & GYNECOLOGY

## 2023-01-12 RX ORDER — POTASSIUM CHLORIDE 20 MEQ/1
20 TABLET, EXTENDED RELEASE ORAL
COMMUNITY
Start: 2022-12-18 | End: 2023-01-26

## 2023-01-12 NOTE — PROGRESS NOTES
Subjective   Chief Complaint   Patient presents with   • Initial Prenatal Visit       Nunu Morgan is a 32 y.o. year old .  Patient's last menstrual period was 10/29/2022.  She presents to be seen to initiate prenatal care.     Social History    Tobacco Use      Smoking status: Never      Smokeless tobacco: Never      The following portions of the patient's history were reviewed and updated as appropriate:vital signs, allergies, current medications, past medical history, past social history, past surgical history and problem list.    Objective   Lab Review   No data reviewed    Imaging   Pelvic ultrasound report    Assessment & Plan   ASSESSMENT  1. 32 y.o. year old  at 10w5d  2. Supervision of high risk pregnancy  3. Obesity in pregnancy    PLAN  1. The problem list for pregnancy was initiated today  2. Tests ordered today:  · None - The following data needs to be obtained to update her medical records: recent labs from OB in [place].    3. Testing for GC / Chlamydia / trichomonas was recently done and will not need to be repeated  4. Genetic testing reviewed: MSAFP-4 and NIPT (Panorama)  5. Her vaccine record was reviewed and updated.  6. Information reviewed: exercise in pregnancy, nutrition in pregnancy, weight gain in pregnancy, work and travel restrictions during pregnancy, list of OTC medications acceptable in pregnancy and call coverage groups    Total time spent today with Nunu  was 30-39 minutes (level 4).  Off this time, 100% was spent face-to-face time coordinating care, answering her questions and counseling regarding exercise in pregnancy, nutrition in pregnancy, weight gain in pregnancy, work and travel restrictions during pregnancy, list of OTC medications acceptable in pregnancy and call coverage groups and pathophysiology of her presenting problem along with plans for any diagnositc work-up and treatment.    Follow up: 3 week(s)       This note was electronically signed.    Marco  ROX Dobbins MD  January 12, 2023

## 2023-01-26 ENCOUNTER — ROUTINE PRENATAL (OUTPATIENT)
Dept: OBSTETRICS AND GYNECOLOGY | Facility: CLINIC | Age: 33
End: 2023-01-26
Payer: COMMERCIAL

## 2023-01-26 VITALS — WEIGHT: 200 LBS | DIASTOLIC BLOOD PRESSURE: 72 MMHG | BODY MASS INDEX: 32.28 KG/M2 | SYSTOLIC BLOOD PRESSURE: 118 MMHG

## 2023-01-26 DIAGNOSIS — Z34.81 SUPERVISION OF NORMAL INTRAUTERINE PREGNANCY IN MULTIGRAVIDA IN FIRST TRIMESTER: ICD-10-CM

## 2023-01-26 DIAGNOSIS — O20.0 THREATENED MISCARRIAGE: Primary | ICD-10-CM

## 2023-01-26 LAB
2ND TRIMESTER 4 SCREEN SERPL-IMP: NORMAL
AFP INTERP SERPL-IMP: NORMAL
EXTERNAL NIPT: NORMAL

## 2023-01-26 PROCEDURE — 0502F SUBSEQUENT PRENATAL CARE: CPT | Performed by: OBSTETRICS & GYNECOLOGY

## 2023-01-26 NOTE — PROGRESS NOTES
Chief Complaint   Patient presents with   • Routine Prenatal Visit       HPI: Nunu is a  currently at 12w5d who today reports the following:  Nausea - No; Vaginal bleeding -  improved as compared to the prior visit; Heartburn - No.    ROS:  GI: Constipation - No; Diarrhea - No    Neuro: Headache - No; Visual change - No    Respiratory: Cough - No; SOB - No; fever - No     EXAM:  Vitals: See prenatal flowsheet   Abdomen: See prenatal flowsheet   Urine glucose/protein: See prenatal flowsheet   Pelvic: See prenatal flowsheet     Prenatal Labs  Lab Results   Component Value Date    HGB 14.0 2022    URINECX No growth 2022       MDM:  Impression: 1. Threatened miscarriage without audible fetal heart tones by Doppler today   Tests done today: 1. Needs ultrasound to confirm viability   Topics discussed: 1. Continue with PNV's  2. Prenatal labs reviewed   Tests scheduled today for her next visit:   Pending ultrasound from today

## 2023-01-26 NOTE — PROGRESS NOTES
U/S shows normal FHT's  Reviewed that persistence of bleeding does increase the risk of  birth and  rupture of membranes  May return to full activity at this point with normal cardiac activity at the end of the first trimester  Still waiting on prenatal lab work to arrive from previous OB.  She will try to help coordinate that for her next visit  Today we discussed genetic testing.  She is aware that the NIPT - Panorama is a screening test.  A screening test is not a diagnostic test.  This means that a negative test does not guarantee an unaffected fetus and a positive test does not mean the fetus has the condition for which the test is being performed.  If the test returns positive, a diagnostic test should be consider to determine if the fetus in fact has the condition.  After considering the options previously presented, she is not interested in having genetic testing performed.

## 2023-02-23 ENCOUNTER — ROUTINE PRENATAL (OUTPATIENT)
Dept: OBSTETRICS AND GYNECOLOGY | Facility: CLINIC | Age: 33
End: 2023-02-23
Payer: COMMERCIAL

## 2023-02-23 VITALS — DIASTOLIC BLOOD PRESSURE: 70 MMHG | WEIGHT: 199 LBS | BODY MASS INDEX: 32.12 KG/M2 | SYSTOLIC BLOOD PRESSURE: 128 MMHG

## 2023-02-23 DIAGNOSIS — Z3A.16 16 WEEKS GESTATION OF PREGNANCY: Primary | ICD-10-CM

## 2023-02-23 DIAGNOSIS — Z34.82 SUPERVISION OF NORMAL INTRAUTERINE PREGNANCY IN MULTIGRAVIDA IN SECOND TRIMESTER: ICD-10-CM

## 2023-02-23 PROCEDURE — 0502F SUBSEQUENT PRENATAL CARE: CPT | Performed by: NURSE PRACTITIONER

## 2023-02-23 NOTE — PROGRESS NOTES
Chief Complaint   Patient presents with   • Routine Prenatal Visit     Ob follow up       HPI: Nunu is a  currently at 16w5d who today reports the following: is having gender ultrasound today to confirm zhao boswell (girl) results  Her only concern today is that her job is requiring her to work up to 70 hours a week on varying shifts.  She has increased pedal swelling and fatigue with this current work schedule.  Contractions - No; Leaking - No; Vaginal bleeding -  No; Heartburn - No.    ROS:  GI: Nausea - No ; Constipation - No; Diarrhea - No    Neuro: Headache - No ; Visual change - No      EXAM:  Vitals: See prenatal flowsheet   Abdomen: See prenatal flowsheet   Urine glucose/protein: See prenatal flowsheet   Pelvic: See prenatal flowsheet     Lab Results   Component Value Date    ABO A 2022    RH Negative 2022       MDM:  Impression: 1. Supervision of high risk pregnancy  2. Obesity in pregnancy   Tests done today: 1. U/S for gender only   Topics discussed: 1. Continue with PNV's  2. Prenatal labs reviewed  3. Nausea, pain, and bleeding precautions reviewed.  Work restrictions sent through [employer].  Recommend 8 hours a day and 40 hours a week with no tugging lifting pulling or straining with objects heavier than 25 pounds.   Tests scheduled today for her next visit:   U/S for anatomic screening

## 2023-03-16 ENCOUNTER — LAB (OUTPATIENT)
Dept: LAB | Facility: HOSPITAL | Age: 33
End: 2023-03-16
Payer: COMMERCIAL

## 2023-03-16 ENCOUNTER — ROUTINE PRENATAL (OUTPATIENT)
Dept: OBSTETRICS AND GYNECOLOGY | Facility: CLINIC | Age: 33
End: 2023-03-16
Payer: COMMERCIAL

## 2023-03-16 VITALS — SYSTOLIC BLOOD PRESSURE: 122 MMHG | WEIGHT: 198 LBS | DIASTOLIC BLOOD PRESSURE: 74 MMHG | BODY MASS INDEX: 31.96 KG/M2

## 2023-03-16 DIAGNOSIS — Z34.82 SUPERVISION OF NORMAL INTRAUTERINE PREGNANCY IN MULTIGRAVIDA IN SECOND TRIMESTER: Primary | ICD-10-CM

## 2023-03-16 DIAGNOSIS — Z34.82 SUPERVISION OF NORMAL INTRAUTERINE PREGNANCY IN MULTIGRAVIDA IN SECOND TRIMESTER: ICD-10-CM

## 2023-03-16 LAB
ABO GROUP BLD: NORMAL
AMPHET+METHAMPHET UR QL: NEGATIVE
AMPHETAMINES UR QL: NEGATIVE
BARBITURATES UR QL SCN: NEGATIVE
BASOPHILS # BLD AUTO: 0.02 10*3/MM3 (ref 0–0.2)
BASOPHILS NFR BLD AUTO: 0.2 % (ref 0–1.5)
BENZODIAZ UR QL SCN: NEGATIVE
BLD GP AB SCN SERPL QL: NEGATIVE
BUPRENORPHINE SERPL-MCNC: NEGATIVE NG/ML
CANNABINOIDS SERPL QL: NEGATIVE
COCAINE UR QL: NEGATIVE
DEPRECATED RDW RBC AUTO: 37.7 FL (ref 37–54)
EOSINOPHIL # BLD AUTO: 0.04 10*3/MM3 (ref 0–0.4)
EOSINOPHIL NFR BLD AUTO: 0.4 % (ref 0.3–6.2)
ERYTHROCYTE [DISTWIDTH] IN BLOOD BY AUTOMATED COUNT: 12.5 % (ref 12.3–15.4)
HBV SURFACE AG SERPL QL IA: NORMAL
HCT VFR BLD AUTO: 38.1 % (ref 34–46.6)
HCV AB SER DONR QL: NORMAL
HGB BLD-MCNC: 13.4 G/DL (ref 12–15.9)
HIV1+2 AB SER QL: NORMAL
IMM GRANULOCYTES # BLD AUTO: 0.07 10*3/MM3 (ref 0–0.05)
IMM GRANULOCYTES NFR BLD AUTO: 0.6 % (ref 0–0.5)
LYMPHOCYTES # BLD AUTO: 1.66 10*3/MM3 (ref 0.7–3.1)
LYMPHOCYTES NFR BLD AUTO: 15.1 % (ref 19.6–45.3)
MCH RBC QN AUTO: 29.8 PG (ref 26.6–33)
MCHC RBC AUTO-ENTMCNC: 35.2 G/DL (ref 31.5–35.7)
MCV RBC AUTO: 84.7 FL (ref 79–97)
METHADONE UR QL SCN: NEGATIVE
MONOCYTES # BLD AUTO: 0.52 10*3/MM3 (ref 0.1–0.9)
MONOCYTES NFR BLD AUTO: 4.7 % (ref 5–12)
NEUTROPHILS NFR BLD AUTO: 79 % (ref 42.7–76)
NEUTROPHILS NFR BLD AUTO: 8.71 10*3/MM3 (ref 1.7–7)
NRBC BLD AUTO-RTO: 0 /100 WBC (ref 0–0.2)
OPIATES UR QL: NEGATIVE
OXYCODONE UR QL SCN: NEGATIVE
PCP UR QL SCN: NEGATIVE
PLATELET # BLD AUTO: 174 10*3/MM3 (ref 140–450)
PMV BLD AUTO: 12.1 FL (ref 6–12)
PROPOXYPH UR QL: NEGATIVE
RBC # BLD AUTO: 4.5 10*6/MM3 (ref 3.77–5.28)
RH BLD: NEGATIVE
TRICYCLICS UR QL SCN: NEGATIVE
WBC NRBC COR # BLD: 11.02 10*3/MM3 (ref 3.4–10.8)

## 2023-03-16 PROCEDURE — 80306 DRUG TEST PRSMV INSTRMNT: CPT

## 2023-03-16 PROCEDURE — 0502F SUBSEQUENT PRENATAL CARE: CPT | Performed by: OBSTETRICS & GYNECOLOGY

## 2023-03-16 PROCEDURE — 80081 OBSTETRIC PANEL INC HIV TSTG: CPT

## 2023-03-16 PROCEDURE — 87591 N.GONORRHOEAE DNA AMP PROB: CPT

## 2023-03-16 PROCEDURE — 86803 HEPATITIS C AB TEST: CPT

## 2023-03-16 PROCEDURE — 87491 CHLMYD TRACH DNA AMP PROBE: CPT

## 2023-03-16 NOTE — PROGRESS NOTES
Chief Complaint   Patient presents with   • Routine Prenatal Visit       HPI: Nunu is a  currently at 19w5d who today reports the following:  Contractions - No; Leaking - No; Vaginal bleeding - No; Heartburn - No.  They have considered delivery options and are certain they want to proceed with elective  section    ROS:  GI: Nausea - No ; Constipation - No; Diarrhea - No    Neuro: Headache - No ; Visual change - No    Respiratory: Cough - No; SOB - No; fever - No     EXAM:  Vitals: See prenatal flowsheet   Abdomen: See prenatal flowsheet   Urine glucose/protein: See prenatal flowsheet   Pelvic: See prenatal flowsheet     Lab Results   Component Value Date    ABO A 2022    RH Negative 2022       MDM:  Impression: 1. Supervision of high risk pregnancy  2. Obesity in pregnancy  3. Delivery by elective  section   Tests ordered/done today: 1. U/S for anatomic screening - anatomy was not completely seen today   Counselin. Prenatal labs reviewed  2. Need to continue with PNV's  3. Explained this cannot be done before 39 weeks unless she proceeds in labor or has an obstetric issue mandating an earlier delivery   Tests ordered today for her next visit:   U/S to complete the anatomic screening

## 2023-03-17 LAB
RPR SER QL: NORMAL
RUBV IGG SERPL IA-ACNC: 4.39 INDEX

## 2023-03-20 LAB
C TRACH RRNA SPEC QL NAA+PROBE: NEGATIVE
N GONORRHOEA RRNA SPEC QL NAA+PROBE: NEGATIVE

## 2023-03-24 ENCOUNTER — TELEPHONE (OUTPATIENT)
Dept: OBSTETRICS AND GYNECOLOGY | Facility: CLINIC | Age: 33
End: 2023-03-24
Payer: COMMERCIAL

## 2023-03-24 NOTE — TELEPHONE ENCOUNTER
Patient had high blood pressure (168/98) and chest pain. She went to [facility] to be evaluated. After a few hours, they discharged the patient. Her blood pressure was normal and the baby's heart rate was normal (155 bpm).     Her next appointment is on 04/13/2023.    Records Requested.

## 2023-03-29 NOTE — TELEPHONE ENCOUNTER
Records do not appear to have been received yet.  Called attempting to reach patient; no answer, unable to LVM - busy tone, two attempts.

## 2023-03-30 NOTE — TELEPHONE ENCOUNTER
Records still do not appear to be on file, attempted to call patient again, no answer, unable to LVM.

## 2023-03-31 NOTE — TELEPHONE ENCOUNTER
Spoke with pt and she stated that her blood pressure has been a lot better.  I have informed her to give the office a call if her blood pressure starts to rise again.

## 2023-04-13 ENCOUNTER — ROUTINE PRENATAL (OUTPATIENT)
Dept: OBSTETRICS AND GYNECOLOGY | Facility: CLINIC | Age: 33
End: 2023-04-13
Payer: COMMERCIAL

## 2023-04-13 VITALS — DIASTOLIC BLOOD PRESSURE: 74 MMHG | SYSTOLIC BLOOD PRESSURE: 126 MMHG | WEIGHT: 201 LBS | BODY MASS INDEX: 32.44 KG/M2

## 2023-04-13 DIAGNOSIS — R25.2 LEG CRAMPS: ICD-10-CM

## 2023-04-13 DIAGNOSIS — O26.892 RH NEGATIVE STATUS DURING PREGNANCY IN SECOND TRIMESTER: ICD-10-CM

## 2023-04-13 DIAGNOSIS — Z34.82 SUPERVISION OF NORMAL INTRAUTERINE PREGNANCY IN MULTIGRAVIDA IN SECOND TRIMESTER: Primary | ICD-10-CM

## 2023-04-13 DIAGNOSIS — Z67.91 RH NEGATIVE STATUS DURING PREGNANCY IN SECOND TRIMESTER: ICD-10-CM

## 2023-04-13 PROCEDURE — 0502F SUBSEQUENT PRENATAL CARE: CPT | Performed by: OBSTETRICS & GYNECOLOGY

## 2023-04-13 NOTE — PROGRESS NOTES
Chief Complaint   Patient presents with   • Routine Prenatal Visit       HPI: Nunu is a  currently at 23w5d who today reports the following:  Contractions - No; Leaking - No; Vaginal bleeding - No; Heartburn - No.  Only complaint is leg cramps    ROS:  GI: Nausea - No ; Constipation - No; Diarrhea - No    Neuro: Headache - No ; Visual change - No    Respiratory: Cough - No; SOB - No; fever - No     EXAM:  Vitals: See prenatal flowsheet   Abdomen: See prenatal flowsheet   Urine glucose/protein: See prenatal flowsheet   Pelvic: See prenatal flowsheet     Lab Results   Component Value Date    ABO A 2023    RH Negative 2023    ABSCRN Negative 2023       MDM:  Impression: 1. Supervision of high risk pregnancy  2. Rh negative  3. Leg cramps   Tests ordered/done today: 1. U/S to complete the anatomic screening - anatomy was not completely seen today   Counselin. Prenatal labs reviewed  2. Need to continue with PNV's  3. Emphasized the need for good shoes with good orthotic support and to stretch both the gastrocnemius and soleus muscles   Tests ordered today for her next visit:   GCT  HgB  ABS  U/S for anatomic screening at perinatology

## 2023-05-11 ENCOUNTER — PREP FOR SURGERY (OUTPATIENT)
Dept: OTHER | Facility: HOSPITAL | Age: 33
End: 2023-05-11
Payer: COMMERCIAL

## 2023-05-11 ENCOUNTER — HOSPITAL ENCOUNTER (OUTPATIENT)
Dept: WOMENS IMAGING | Facility: HOSPITAL | Age: 33
Discharge: HOME | End: 2023-05-11
Payer: COMMERCIAL

## 2023-05-11 ENCOUNTER — ROUTINE PRENATAL (OUTPATIENT)
Dept: OBSTETRICS AND GYNECOLOGY | Facility: CLINIC | Age: 33
End: 2023-05-11
Payer: COMMERCIAL

## 2023-05-11 ENCOUNTER — LAB (OUTPATIENT)
Dept: LAB | Facility: HOSPITAL | Age: 33
End: 2023-05-11
Payer: COMMERCIAL

## 2023-05-11 ENCOUNTER — OFFICE VISIT (OUTPATIENT)
Dept: OBSTETRICS AND GYNECOLOGY | Facility: HOSPITAL | Age: 33
End: 2023-05-11
Payer: COMMERCIAL

## 2023-05-11 VITALS — WEIGHT: 201 LBS | BODY MASS INDEX: 32.44 KG/M2

## 2023-05-11 VITALS — SYSTOLIC BLOOD PRESSURE: 124 MMHG | WEIGHT: 201 LBS | BODY MASS INDEX: 32.44 KG/M2 | DIASTOLIC BLOOD PRESSURE: 68 MMHG

## 2023-05-11 DIAGNOSIS — O99.810 ABNORMAL O'SULLIVAN GLUCOSE CHALLENGE TEST, ANTEPARTUM: ICD-10-CM

## 2023-05-11 DIAGNOSIS — Z34.90 PREGNANCY, UNSPECIFIED GESTATIONAL AGE: Primary | ICD-10-CM

## 2023-05-11 DIAGNOSIS — O26.892 RH NEGATIVE STATUS DURING PREGNANCY IN SECOND TRIMESTER: ICD-10-CM

## 2023-05-11 DIAGNOSIS — Z34.82 SUPERVISION OF NORMAL INTRAUTERINE PREGNANCY IN MULTIGRAVIDA IN SECOND TRIMESTER: ICD-10-CM

## 2023-05-11 DIAGNOSIS — Z03.73 FETAL ANOMALY SUSPECTED BUT NOT FOUND: ICD-10-CM

## 2023-05-11 DIAGNOSIS — Z34.82 SUPERVISION OF NORMAL INTRAUTERINE PREGNANCY IN MULTIGRAVIDA IN SECOND TRIMESTER: Primary | ICD-10-CM

## 2023-05-11 DIAGNOSIS — R25.2 LEG CRAMPS: ICD-10-CM

## 2023-05-11 DIAGNOSIS — Z67.91 RH NEGATIVE STATUS DURING PREGNANCY IN SECOND TRIMESTER: ICD-10-CM

## 2023-05-11 DIAGNOSIS — Z34.83 SUPERVISION OF NORMAL INTRAUTERINE PREGNANCY IN MULTIGRAVIDA IN THIRD TRIMESTER: ICD-10-CM

## 2023-05-11 DIAGNOSIS — Z34.82 PRENATAL CARE, SUBSEQUENT PREGNANCY, SECOND TRIMESTER: Primary | ICD-10-CM

## 2023-05-11 PROBLEM — O99.019 MATERNAL ANEMIA IN PREGNANCY, ANTEPARTUM: Status: ACTIVE | Noted: 2023-05-11

## 2023-05-11 LAB
BLD GP AB SCN SERPL QL: NEGATIVE
GLUCOSE 1H P 100 G GLC PO SERPL-MCNC: 158 MG/DL (ref 65–139)
HCT VFR BLD AUTO: 35 % (ref 34–46.6)
HGB BLD-MCNC: 11.9 G/DL (ref 12–15.9)

## 2023-05-11 PROCEDURE — 85014 HEMATOCRIT: CPT

## 2023-05-11 PROCEDURE — 82962 GLUCOSE BLOOD TEST: CPT

## 2023-05-11 PROCEDURE — 82950 GLUCOSE TEST: CPT

## 2023-05-11 PROCEDURE — 85018 HEMOGLOBIN: CPT

## 2023-05-11 PROCEDURE — 86850 RBC ANTIBODY SCREEN: CPT

## 2023-05-11 PROCEDURE — 36415 COLL VENOUS BLD VENIPUNCTURE: CPT

## 2023-05-11 PROCEDURE — 76811 OB US DETAILED SNGL FETUS: CPT

## 2023-05-11 RX ORDER — CARBOPROST TROMETHAMINE 250 UG/ML
250 INJECTION, SOLUTION INTRAMUSCULAR AS NEEDED
OUTPATIENT
Start: 2023-05-11

## 2023-05-11 RX ORDER — MISOPROSTOL 200 UG/1
800 TABLET ORAL AS NEEDED
OUTPATIENT
Start: 2023-05-11

## 2023-05-11 RX ORDER — OXYTOCIN/0.9 % SODIUM CHLORIDE 30/500 ML
85 PLASTIC BAG, INJECTION (ML) INTRAVENOUS ONCE
OUTPATIENT
Start: 2023-05-11 | End: 2023-05-11

## 2023-05-11 RX ORDER — CEFAZOLIN SODIUM 2 G/100ML
2 INJECTION, SOLUTION INTRAVENOUS ONCE
OUTPATIENT
Start: 2023-05-11 | End: 2023-05-11

## 2023-05-11 RX ORDER — FERROUS SULFATE 325(65) MG
325 TABLET ORAL
Qty: 60 TABLET | Refills: 4 | Status: SHIPPED | OUTPATIENT
Start: 2023-05-11 | End: 2023-10-08

## 2023-05-11 RX ORDER — LIDOCAINE HYDROCHLORIDE 10 MG/ML
5 INJECTION, SOLUTION EPIDURAL; INFILTRATION; INTRACAUDAL; PERINEURAL AS NEEDED
OUTPATIENT
Start: 2023-05-11

## 2023-05-11 RX ORDER — OXYTOCIN/0.9 % SODIUM CHLORIDE 30/500 ML
650 PLASTIC BAG, INJECTION (ML) INTRAVENOUS ONCE
OUTPATIENT
Start: 2023-05-11 | End: 2023-05-11

## 2023-05-11 RX ORDER — TRISODIUM CITRATE DIHYDRATE AND CITRIC ACID MONOHYDRATE 500; 334 MG/5ML; MG/5ML
30 SOLUTION ORAL ONCE
OUTPATIENT
Start: 2023-05-11 | End: 2023-05-11

## 2023-05-11 RX ORDER — SODIUM CHLORIDE, SODIUM LACTATE, POTASSIUM CHLORIDE, CALCIUM CHLORIDE 600; 310; 30; 20 MG/100ML; MG/100ML; MG/100ML; MG/100ML
125 INJECTION, SOLUTION INTRAVENOUS CONTINUOUS
OUTPATIENT
Start: 2023-05-11

## 2023-05-11 RX ORDER — SODIUM CHLORIDE 0.9 % (FLUSH) 0.9 %
1-10 SYRINGE (ML) INJECTION AS NEEDED
OUTPATIENT
Start: 2023-05-11

## 2023-05-11 RX ORDER — ACETAMINOPHEN 500 MG
1000 TABLET ORAL ONCE
OUTPATIENT
Start: 2023-05-11 | End: 2023-05-11

## 2023-05-11 RX ORDER — PROMETHAZINE HYDROCHLORIDE 12.5 MG/1
12.5 SUPPOSITORY RECTAL EVERY 6 HOURS PRN
OUTPATIENT
Start: 2023-05-11

## 2023-05-11 RX ORDER — PROMETHAZINE HYDROCHLORIDE 12.5 MG/1
12.5 TABLET ORAL EVERY 6 HOURS PRN
OUTPATIENT
Start: 2023-05-11

## 2023-05-11 RX ORDER — METHYLERGONOVINE MALEATE 0.2 MG/ML
200 INJECTION INTRAVENOUS ONCE AS NEEDED
OUTPATIENT
Start: 2023-05-11

## 2023-05-11 RX ORDER — SODIUM CHLORIDE 0.9 % (FLUSH) 0.9 %
10 SYRINGE (ML) INJECTION EVERY 12 HOURS SCHEDULED
OUTPATIENT
Start: 2023-05-11

## 2023-05-11 RX ORDER — KETOROLAC TROMETHAMINE 30 MG/ML
30 INJECTION, SOLUTION INTRAMUSCULAR; INTRAVENOUS ONCE
OUTPATIENT
Start: 2023-05-11 | End: 2023-05-11

## 2023-05-11 NOTE — PROGRESS NOTES
Patient has no complaints, here for anomaly scan, patients next follow up is today.  NIPT was declined.

## 2023-05-11 NOTE — PROGRESS NOTES
Chief Complaint   Patient presents with   • Routine Prenatal Visit       HPI: Nunu is a  currently at 27w5d who today reports the following:  Contractions - No; Leaking - No; Vaginal bleeding - No; Heartburn - No.  Leg cramps have improved    ROS:  GI: Nausea - No ; Constipation - No; Diarrhea - No    Neuro: Headache - No ; Visual change - No    Respiratory: Cough - No; SOB - No; fever - No     EXAM:  Vitals: See prenatal flowsheet   Abdomen: See prenatal flowsheet   Urine glucose/protein: See prenatal flowsheet   Pelvic: See prenatal flowsheet     Lab Results   Component Value Date    ABO A 2023    RH Negative 2023    ABSCRN Negative 2023       MDM:  Impression: 1. Supervision of high risk pregnancy  2. Rh negative  3. Failed GCT   4. Prior leg cramps have improved   Tests ordered/done today: 1. antibody screen & Rhogam  2. GCT  3. HgB  4. U/S to complete the anatomic screening - anatomy completely seen today   Counselin. Prenatal labs reviewed  2. Need to continue with PNV's  3. Needs GTT   Tests ordered today for her next visit:   none

## 2023-05-15 ENCOUNTER — PREP FOR SURGERY (OUTPATIENT)
Dept: OTHER | Facility: HOSPITAL | Age: 33
End: 2023-05-15
Payer: COMMERCIAL

## 2023-05-16 ENCOUNTER — PREP FOR SURGERY (OUTPATIENT)
Dept: OTHER | Facility: HOSPITAL | Age: 33
End: 2023-05-16
Payer: COMMERCIAL

## 2023-05-22 ENCOUNTER — ROUTINE PRENATAL (OUTPATIENT)
Dept: OBSTETRICS AND GYNECOLOGY | Facility: CLINIC | Age: 33
End: 2023-05-22
Payer: COMMERCIAL

## 2023-05-22 ENCOUNTER — LAB (OUTPATIENT)
Dept: LAB | Facility: HOSPITAL | Age: 33
End: 2023-05-22
Payer: COMMERCIAL

## 2023-05-22 VITALS — WEIGHT: 200 LBS | SYSTOLIC BLOOD PRESSURE: 124 MMHG | DIASTOLIC BLOOD PRESSURE: 68 MMHG | BODY MASS INDEX: 32.28 KG/M2

## 2023-05-22 DIAGNOSIS — O99.810 ABNORMAL O'SULLIVAN GLUCOSE CHALLENGE TEST, ANTEPARTUM: ICD-10-CM

## 2023-05-22 DIAGNOSIS — Z34.83 SUPERVISION OF NORMAL INTRAUTERINE PREGNANCY IN MULTIGRAVIDA IN THIRD TRIMESTER: Primary | ICD-10-CM

## 2023-05-22 DIAGNOSIS — O99.810 ABNORMAL MATERNAL GLUCOSE TOLERANCE, ANTEPARTUM: Primary | ICD-10-CM

## 2023-05-22 LAB
GLUCOSE BLDC GLUCOMTR-MCNC: 126 MG/DL
GLUCOSE BLDC GLUCOMTR-MCNC: 92 MG/DL (ref 70–130)
GLUCOSE P FAST SERPL-MCNC: 89 MG/DL (ref 65–94)
GLUCOSE UR STRIP-MCNC: NEGATIVE MG/DL
GTT GEST 2H PNL UR+SERPL: 164 MG/DL (ref 65–179)
GTT GEST 3H PNL SERPL: 122 MG/DL (ref 65–139)
GTT GEST 3H PNL SERPL: 145 MG/DL (ref 65–154)
PROT UR STRIP-MCNC: NEGATIVE MG/DL

## 2023-05-22 PROCEDURE — 82952 GTT-ADDED SAMPLES: CPT

## 2023-05-22 PROCEDURE — 82951 GLUCOSE TOLERANCE TEST (GTT): CPT

## 2023-05-22 PROCEDURE — 82962 GLUCOSE BLOOD TEST: CPT

## 2023-05-22 PROCEDURE — 36415 COLL VENOUS BLD VENIPUNCTURE: CPT

## 2023-05-22 PROCEDURE — 0502F SUBSEQUENT PRENATAL CARE: CPT | Performed by: OBSTETRICS & GYNECOLOGY

## 2023-05-22 NOTE — PROGRESS NOTES
Chief Complaint   Patient presents with   • Routine Prenatal Visit       HPI: Nunu is a  currently at 29w2d who today reports the following:  Contractions - No; Leaking - No; Vaginal bleeding -  No; Swelling of extremities - No.    ROS:  GI: Nausea - No; Constipation - No; Diarrhea - No    Neuro: Headache - No; Visual change - No    Respiratory: Cough - No; SOB - No; fever - No     EXAM:  Vitals: See prenatal flowsheet   Abdomen: See prenatal flowsheet   Urine glucose/protein: See prenatal flowsheet   Pelvic: See prenatal flowsheet   MDM:   Impression: 1. Supervision of high risk pregnancy  2. LGA but last U/S was S=D   3. Failed GCT - GTT normal today   Tests done today: 1. GTT   Topics discussed: 1. Continue with PNV's  2. Prenatal labs reviewed  3. Childbirth classes   Tests scheduled today for her next visit:   none

## 2023-06-08 ENCOUNTER — ROUTINE PRENATAL (OUTPATIENT)
Dept: OBSTETRICS AND GYNECOLOGY | Facility: CLINIC | Age: 33
End: 2023-06-08
Payer: COMMERCIAL

## 2023-06-08 VITALS — WEIGHT: 201 LBS | SYSTOLIC BLOOD PRESSURE: 126 MMHG | BODY MASS INDEX: 32.44 KG/M2 | DIASTOLIC BLOOD PRESSURE: 74 MMHG

## 2023-06-08 DIAGNOSIS — Z34.83 SUPERVISION OF NORMAL INTRAUTERINE PREGNANCY IN MULTIGRAVIDA IN THIRD TRIMESTER: Primary | ICD-10-CM

## 2023-06-08 DIAGNOSIS — O99.019 MATERNAL ANEMIA IN PREGNANCY, ANTEPARTUM: ICD-10-CM

## 2023-06-08 PROCEDURE — 0502F SUBSEQUENT PRENATAL CARE: CPT | Performed by: OBSTETRICS & GYNECOLOGY

## 2023-06-08 NOTE — LETTER
June 8, 2023     Patient: Nunu Morgan   YOB: 1990   Date of Visit: 6/8/2023       To Whom It May Concern:    It is my medical opinion that Nunu Morgan should limit her work to 8 hours/day and 40 hours/week.  This specifically means no more than 8 hours of work in a 24-hour window of time.           Sincerely,        Marco Dobbins MD

## 2023-06-08 NOTE — PROGRESS NOTES
Chief Complaint   Patient presents with    Routine Prenatal Visit       HPI: Nunu is a  currently at 31w5d who today reports the following:  Contractions - YES - but less than 4/hour AND no associated change in vaginal discharge; Leaking - No; Vaginal bleeding -  No; Swelling of extremities - YES.  She does need to work note clarifying her work hours.  Although we previously had written a note specifying 8-hour workdays and 40-hour work weeks they are making her work more than 8 hours in a 24-hour shift because she is having to work just after midnight when she is worked previously    ROS:  GI: Nausea - No; Constipation - No; Diarrhea - No    Neuro: Headache - No; Visual change - No    Respiratory: Cough - No; SOB - No; fever - No     EXAM:  Vitals: See prenatal flowsheet   Abdomen: See prenatal flowsheet   Urine glucose/protein: See prenatal flowsheet   Pelvic: See prenatal flowsheet   MDM:   Impression: Supervision of high risk pregnancy  Rh negative  Elective    Tests done today: none   Topics discussed: Continue with PNV's  Prenatal labs reviewed  Work restriction letter created   Tests scheduled today for her next visit:   none

## 2023-06-15 ENCOUNTER — TELEPHONE (OUTPATIENT)
Dept: OBSTETRICS AND GYNECOLOGY | Facility: CLINIC | Age: 33
End: 2023-06-15
Payer: COMMERCIAL

## 2023-06-15 ENCOUNTER — HOSPITAL ENCOUNTER (OUTPATIENT)
Facility: HOSPITAL | Age: 33
Setting detail: OBSERVATION
Discharge: HOME | End: 2023-06-15
Attending: OBSTETRICS & GYNECOLOGY | Admitting: OBSTETRICS & GYNECOLOGY
Payer: COMMERCIAL

## 2023-06-15 VITALS
HEART RATE: 98 BPM | TEMPERATURE: 97.8 F | RESPIRATION RATE: 15 BRPM | SYSTOLIC BLOOD PRESSURE: 131 MMHG | DIASTOLIC BLOOD PRESSURE: 84 MMHG

## 2023-06-15 PROBLEM — O47.00 PRETERM CONTRACTIONS: Status: ACTIVE | Noted: 2023-06-15

## 2023-06-15 PROCEDURE — 59025 FETAL NON-STRESS TEST: CPT

## 2023-06-15 PROCEDURE — G0378 HOSPITAL OBSERVATION PER HR: HCPCS

## 2023-06-15 NOTE — TELEPHONE ENCOUNTER
Mu pt, 32 weeks pregnant calls and complains of leaking fluid and mucous beginning yesterday.  Patient started feeling some lower abdominal pressure this morning.  Per Mu, patient was advised to go to Labor & Delivery.

## 2023-06-15 NOTE — H&P
[facility]  Obstetric History and Physical    Referring Provider: Marco Dobbins MD      Chief Complaint   Patient presents with    Contractions       Subjective     Patient is a 32 y.o. female  currently at 32w5d, who presents with contractions.  Patient reports some mild irregular lower abdominal cramping since last evening without any vaginal bleeding, recent trauma, fever, and urinary symptoms.  Of note, she had increased discharge last evening but no subsequent leaking of fluid or any other concerns.  She reports normal fetal activity.  Patient scheduled for a primary elective  at 39 weeks gestation        The following portions of the patients history were reviewed and updated as appropriate: current medications, allergies, past medical history, past surgical history, past family history, past social history, and problem list .       Prenatal Information:   Maternal Prenatal Labs  Blood Type No results found for: ABO   Rh Status No results found for: RH   Antibody Screen No results found for: ABSCRN   Gonnorhea No results found for: GCCX   Chlamydia No results found for: CLAMYDCU   RPR No results found for: RPR   Syphilis Antibody No results found for: SYPHILIS   Rubella No results found for: RUBELLAIGGIN   Hepatitis B Surface Antigen No results found for: HEPBSAG   HIV-1 Antibody No results found for: LABHIV1   Hepatitis C Antibody No results found for: HEPCAB   Rapid Urin Drug Screen No results found for: AMPMETHU, BARBITSCNUR, LABBENZSCN, LABMETHSCN, LABOPIASCN, THCURSCR, COCAINEUR, AMPHETSCREEN, PROPOXSCN, BUPRENORSCNU, METAMPSCNUR, OXYCODONESCN, TRICYCLICSCN   Group B Strep Culture No results found for: GBSANTIGEN           External Prenatal Results       Pregnancy Outside Results - Transcribed From Office Records - See Scanned Records For Details       Test Value Date Time    ABO  A  23 1254    Rh  Negative  23 1254    Antibody Screen  Negative  23 0930        Negative  23 1254    Varicella IgG       Rubella  4.39 index 23 1254    Hgb  11.9 g/dL 23 0928       13.4 g/dL 23 1254       14.0 g/dL 22 0156    Hct  35.0 % 23 0928       38.1 % 23 1254       43.2 % 22 0156    Glucose Fasting GTT  89 mg/dL 23 0704    Glucose Tolerance Test 1 hour  164 mg/dL 23 1012    Glucose Tolerance Test 3 hour  122 mg/dL 23 1012    Gonorrhea (discrete)  Negative  23 1254    Chlamydia (discrete)  Negative  23 1254    RPR  Non-Reactive  23 1254    VDRL       Syphilis Antibody       HBsAg  Non-Reactive  23 1254    Herpes Simplex Virus PCR       Herpes Simplex VIrus Culture       HIV  Non-Reactive  23 1254    Hep C RNA Quant PCR       Hep C Antibody  Non-Reactive  23 1254    AFP       Group B Strep       GBS Susceptibility to Clindamycin       GBS Susceptibility to Erythromycin       Fetal Fibronectin       Genetic Testing, Maternal Blood                 Drug Screening       Test Value Date Time    Urine Drug Screen       Amphetamine Screen  Negative  23 1254    Barbiturate Screen  Negative  23 1254    Benzodiazepine Screen  Negative  23 1254    Methadone Screen  Negative  23 1254    Phencyclidine Screen  Negative  23 1254    Opiates Screen  Negative  23 1254    THC Screen  Negative  23 1254    Cocaine Screen       Propoxyphene Screen  Negative  23 1254    Buprenorphine Screen  Negative  23 1254    Methamphetamine Screen       Oxycodone Screen  Negative  23 1254    Tricyclic Antidepressants Screen  Negative  23 1254              Legend    ^: Historical                              Past OB History:       OB History    Para Term  AB Living   2 0 0 0 1 0   SAB IAB Ectopic Molar Multiple Live Births   1 0 0 0 0 0      # Outcome Date GA Lbr Koko/2nd Weight Sex Delivery Anes PTL Lv   2 Current            1 SAB 2018     SAB          Obstetric Comments   Fob #1 pregancy #1   Fob #2 pregnancy #2   NIPT declined       Past Medical History: Past Medical History:   Diagnosis Date    Anemia 2016    Sees PCP    SVT (supraventricular tachycardia) 2013    has seen cardiology, no medications      Past Surgical History Past Surgical History:   Procedure Laterality Date    APPENDECTOMY OPEN  05/2011    BILATERAL BREAST REDUCTION Bilateral 11/2015    BREAST LUMPECTOMY Right 08/2019    pathology showed ductal hyperplasia    LAPAROSCOPIC CHOLECYSTECTOMY  07/2011      Family History: Family History   Problem Relation Age of Onset    Breast cancer Paternal Grandmother     Breast cancer Maternal Grandmother 70    Breast cancer Paternal Aunt       Social History:  reports that she has never smoked. She has never used smokeless tobacco.   reports no history of alcohol use.   reports no history of drug use.                   General ROS Negative Findings:Headaches, Visual Changes, Epigastric pain, Anorexia, Nausia/Vomiting, ROM, and Vaginal Bleeding    ROS     All other systems have been reviewed and are neg  Objective       Vital Signs Range for the last 24 hours  Temperature: Temp:  [97.8 °F (36.6 °C)] 97.8 °F (36.6 °C)   Temp Source: Temp src: Oral   BP: BP: (132)/(90) 132/90   Pulse: Heart Rate:  [107] 107   Respirations: Resp:  [15] 15   SPO2:     O2 Amount (l/min):     O2 Devices     Weight:       Physical Examination:   General:   alert, appears stated age, and cooperative   Skin:   normal   HEENT: Clear clear   Lungs:      Heart:      Gastrointestinal: Abdomen soft, gravid uterus, no guarding benign exam   Lower Extremities:    : External genitalia: normal general appearance  Uterus: enlarged   Musculoskeletal:     Neuro: No focal deficits Rayes of edema, no calf tenderness         Presentation:    Cervix: Exam by:     Dilation:  Closed   Effacement:  Thick   Station:  Engaged  pH less than 4       Fetal Heart Rate Assessment   Method:      Beats/min:     Baseline:     Varibility:     Accels:     Decels:     Tracing Category:     C-indications possible contractions, interpretation reactive, moderate variability, accelerations present 15 x 15, no fetal deceleration noted, onset 1000, end time 1524, no  contractions noted  Uterine Assessment   Method:     Frequency (min):     Ctx Count in 10 min:     Duration:     Intensity:     Intensity by IUPC:     Resting Tone:     Resting Tone by IUPC:     [place] Units:       Laboratory Results:   Lab Results (last 24 hours)       ** No results found for the last 24 hours. **          Radiology Review:   Imaging Results (Last 24 Hours)       ** No results found for the last 24 hours. **          Other Studies:    Assessment & Plan       Delivery by elective  section 8/3/23 @ 07:30     contractions        Assessment:  1.  Intrauterine pregnancy at 32w5d weeks gestation with reactive fetal status.    2.  False labor  3.   4.      Plan:  1.  Discharged home, kick count,  labor instructions given, follow-up with provider routine or as needed  2. Plan of care has been reviewed with patient.  3.  Risks, benefits of treatment plan have been discussed.  4.  All questions have been answered.  5      Fred Kellogg DO  6/15/2023  15:29 EDT

## 2023-06-18 PROBLEM — O47.00 PRETERM CONTRACTIONS: Status: RESOLVED | Noted: 2023-06-15 | Resolved: 2023-06-18

## 2023-06-19 ENCOUNTER — TELEPHONE (OUTPATIENT)
Dept: OBSTETRICS AND GYNECOLOGY | Facility: CLINIC | Age: 33
End: 2023-06-19
Payer: COMMERCIAL

## 2023-06-19 NOTE — TELEPHONE ENCOUNTER
Letter has been created and sent to patient through [employer].  If she would prefer to have this faxed or mailed, that can be done as well.

## 2023-06-19 NOTE — LETTER
June 19, 2023     Patient: Nunu Morgan   YOB: 1990   Date of Visit: 6/19/2023       To Whom It May Concern:    Nunu Morgan is a patient of mine.  She is currently 33 weeks pregnant.  She is due August 5, 2023.  She asked me to write a letter on her behalf regarding FMLA.  She tells me she is hoping to start using her FMLA today and will be off work until 6 weeks after her delivery.    I explained to Nunu that the use of FMLA is a decision between herself and her employer.  Her being pregnant is a qualifying event that entitles her to FMLA assuming she meets the qualifications set forth under the policy.  How she chooses to take days away from work using FMLA is a matter between her and her employer.    Should there be any questions please do not hesitate to call.           Sincerely,        Marco Dobbins MD

## 2023-06-19 NOTE — TELEPHONE ENCOUNTER
TOBY    Pt called stating that she needs a letter stating that she needs to be off starting today up until 6 weeks after delivery.

## 2023-07-11 PROBLEM — O99.019 MATERNAL ANEMIA IN PREGNANCY, ANTEPARTUM: Status: RESOLVED | Noted: 2023-05-11 | Resolved: 2023-07-11

## 2023-07-26 ENCOUNTER — TELEPHONE (OUTPATIENT)
Dept: OBSTETRICS AND GYNECOLOGY | Facility: CLINIC | Age: 33
End: 2023-07-26
Payer: COMMERCIAL

## 2023-07-26 ENCOUNTER — HOSPITAL ENCOUNTER (OUTPATIENT)
Facility: HOSPITAL | Age: 33
Setting detail: SURGERY ADMIT
Discharge: HOME | End: 2023-07-26
Attending: OBSTETRICS & GYNECOLOGY | Admitting: OBSTETRICS & GYNECOLOGY
Payer: COMMERCIAL

## 2023-07-26 VITALS
HEART RATE: 100 BPM | HEIGHT: 66 IN | DIASTOLIC BLOOD PRESSURE: 78 MMHG | SYSTOLIC BLOOD PRESSURE: 127 MMHG | TEMPERATURE: 97.9 F | WEIGHT: 204 LBS | RESPIRATION RATE: 16 BRPM | BODY MASS INDEX: 32.78 KG/M2

## 2023-07-26 PROCEDURE — 59025 FETAL NON-STRESS TEST: CPT | Performed by: OBSTETRICS & GYNECOLOGY

## 2023-07-26 PROCEDURE — 99234 HOSP IP/OBS SM DT SF/LOW 45: CPT | Performed by: OBSTETRICS & GYNECOLOGY

## 2023-07-26 PROCEDURE — G0463 HOSPITAL OUTPT CLINIC VISIT: HCPCS

## 2023-07-26 NOTE — TELEPHONE ENCOUNTER
TOBY    Pt is 38w4d. Pt states since 10:30pm last night she has been having increased pelvic pressure , low to mid back pain and her [place] Jimenez is more consistent. Pt lives 2hrs away so wants to know what Provider thinks she needs to do?

## 2023-07-26 NOTE — TELEPHONE ENCOUNTER
I had gotten days mixed up when on earlier phone call with patient - Dr. Dobbins is not in office but is in surgery all day.  I spoke with clinical management as well as Dr. Harrington's MA regarding other options for being seen today potentially in office if patient was really not wanting to go to L&D, however primary recommendation was confirmed to be L&D with clinical management. Called and spoke with patient, informed her of potentially option of being seen in office with Dr. Harrington this afternoon, however she stated that her  still had not gotten back to take her to [facility], so it may be too late to get patient scheduled in office for today.  Patient is heading to L&D ASAP for evaluation.

## 2023-07-26 NOTE — TELEPHONE ENCOUNTER
Called and spoke with patient, she stated that at this time contractions are not happening.  She states over night and very early this morning they had spells of about 2 hours where they were happening 5-10 minutes apart. No vaginal bleeding/spotting/leakage. Fetal movement is decreased today and yesterday, she is feeling movement about once every couple hours, usually when the patient eats, but has previously felt her moving much more. She also notes increase pelvic pressure as well as increase in pelvic pain and back pain - back pain was at it's worst last night.     I let patient know general L&D precautions which would point patient in direction of coming to L&D to be seen given decreased fetal movement and increase in pelvic pain.  She states that she lived 2.5 hours away though and would like to know what Dr. Dobbins recommends as she has an appointment scheduled with him tomorrow.     Routing to physician to seek specific advisement.

## 2023-07-28 ENCOUNTER — ANESTHESIA (OUTPATIENT)
Dept: LABOR AND DELIVERY | Facility: HOSPITAL | Age: 33
End: 2023-07-28
Payer: COMMERCIAL

## 2023-07-28 ENCOUNTER — HOSPITAL ENCOUNTER (INPATIENT)
Facility: HOSPITAL | Age: 33
LOS: 3 days | Discharge: HOME | End: 2023-07-31
Attending: OBSTETRICS & GYNECOLOGY | Admitting: OBSTETRICS & GYNECOLOGY
Payer: COMMERCIAL

## 2023-07-28 ENCOUNTER — ANESTHESIA EVENT (OUTPATIENT)
Dept: LABOR AND DELIVERY | Facility: HOSPITAL | Age: 33
End: 2023-07-28
Payer: COMMERCIAL

## 2023-07-28 DIAGNOSIS — Z34.83 SUPERVISION OF NORMAL INTRAUTERINE PREGNANCY IN MULTIGRAVIDA IN THIRD TRIMESTER: ICD-10-CM

## 2023-07-28 PROBLEM — Z37.9 NORMAL LABOR: Status: ACTIVE | Noted: 2023-07-28

## 2023-07-28 LAB
DEPRECATED RDW RBC AUTO: 44.7 FL (ref 37–54)
ERYTHROCYTE [DISTWIDTH] IN BLOOD BY AUTOMATED COUNT: 14.2 % (ref 12.3–15.4)
HCT VFR BLD AUTO: 40.9 % (ref 34–46.6)
HGB BLD-MCNC: 13.7 G/DL (ref 12–15.9)
MCH RBC QN AUTO: 28.9 PG (ref 26.6–33)
MCHC RBC AUTO-ENTMCNC: 33.5 G/DL (ref 31.5–35.7)
MCV RBC AUTO: 86.3 FL (ref 79–97)
PLATELET # BLD AUTO: 151 10*3/MM3 (ref 140–450)
PMV BLD AUTO: 11.3 FL (ref 6–12)
RBC # BLD AUTO: 4.74 10*6/MM3 (ref 3.77–5.28)
WBC NRBC COR # BLD: 10.25 10*3/MM3 (ref 3.4–10.8)

## 2023-07-28 PROCEDURE — 25010000002 GENTAMICIN PER 80 MG: Performed by: OBSTETRICS & GYNECOLOGY

## 2023-07-28 PROCEDURE — 86900 BLOOD TYPING SEROLOGIC ABO: CPT | Performed by: OBSTETRICS & GYNECOLOGY

## 2023-07-28 PROCEDURE — 86870 RBC ANTIBODY IDENTIFICATION: CPT | Performed by: OBSTETRICS & GYNECOLOGY

## 2023-07-28 PROCEDURE — 85027 COMPLETE CBC AUTOMATED: CPT | Performed by: OBSTETRICS & GYNECOLOGY

## 2023-07-28 PROCEDURE — 86850 RBC ANTIBODY SCREEN: CPT | Performed by: OBSTETRICS & GYNECOLOGY

## 2023-07-28 PROCEDURE — 86901 BLOOD TYPING SEROLOGIC RH(D): CPT | Performed by: OBSTETRICS & GYNECOLOGY

## 2023-07-28 RX ORDER — ACETAMINOPHEN 500 MG
1000 TABLET ORAL ONCE
Status: COMPLETED | OUTPATIENT
Start: 2023-07-29 | End: 2023-07-28

## 2023-07-28 RX ORDER — CLINDAMYCIN PHOSPHATE 600 MG/50ML
600 INJECTION INTRAVENOUS ONCE
Status: COMPLETED | OUTPATIENT
Start: 2023-07-29 | End: 2023-07-29

## 2023-07-28 RX ORDER — CLINDAMYCIN PHOSPHATE 300 MG/50ML
300 INJECTION INTRAVENOUS ONCE
Status: DISCONTINUED | OUTPATIENT
Start: 2023-07-29 | End: 2023-07-29

## 2023-07-28 RX ORDER — CLINDAMYCIN PHOSPHATE 300 MG/50ML
300 INJECTION INTRAVENOUS ONCE
Status: DISCONTINUED | OUTPATIENT
Start: 2023-07-29 | End: 2023-07-28

## 2023-07-28 RX ORDER — SODIUM CHLORIDE, SODIUM LACTATE, POTASSIUM CHLORIDE, CALCIUM CHLORIDE 600; 310; 30; 20 MG/100ML; MG/100ML; MG/100ML; MG/100ML
125 INJECTION, SOLUTION INTRAVENOUS CONTINUOUS
Status: DISCONTINUED | OUTPATIENT
Start: 2023-07-29 | End: 2023-07-29

## 2023-07-28 RX ORDER — TRISODIUM CITRATE DIHYDRATE AND CITRIC ACID MONOHYDRATE 500; 334 MG/5ML; MG/5ML
30 SOLUTION ORAL ONCE
Status: COMPLETED | OUTPATIENT
Start: 2023-07-29 | End: 2023-07-29

## 2023-07-28 RX ORDER — CLINDAMYCIN PHOSPHATE 900 MG/50ML
900 INJECTION INTRAVENOUS ONCE
Status: DISCONTINUED | OUTPATIENT
Start: 2023-07-29 | End: 2023-07-28

## 2023-07-28 RX ADMIN — ACETAMINOPHEN 1000 MG: 500 TABLET ORAL at 23:37

## 2023-07-28 RX ADMIN — GENTAMICIN SULFATE 360 MG: 40 INJECTION, SOLUTION INTRAMUSCULAR; INTRAVENOUS at 23:37

## 2023-07-29 PROBLEM — Z34.83 SUPERVISION OF NORMAL INTRAUTERINE PREGNANCY IN MULTIGRAVIDA IN THIRD TRIMESTER: Status: RESOLVED | Noted: 2023-01-07 | Resolved: 2023-07-29

## 2023-07-29 PROBLEM — O26.899 RH NEGATIVE STATUS DURING PREGNANCY: Status: RESOLVED | Noted: 2023-01-12 | Resolved: 2023-07-29

## 2023-07-29 PROBLEM — Z37.9 NORMAL LABOR: Status: RESOLVED | Noted: 2023-07-28 | Resolved: 2023-07-29

## 2023-07-29 PROBLEM — Z67.91 RH NEGATIVE STATUS DURING PREGNANCY: Status: RESOLVED | Noted: 2023-01-12 | Resolved: 2023-07-29

## 2023-07-29 LAB
ABO GROUP BLD: NORMAL
ABO GROUP BLD: NORMAL
BLD GP AB SCN SERPL QL: POSITIVE
FETAL BLEED: NEGATIVE
NUMBER OF DOSES: NORMAL
RESIDUAL RHIG DETECTED: NORMAL
RH BLD: NEGATIVE
RH BLD: NEGATIVE
T&S EXPIRATION DATE: NORMAL

## 2023-07-29 PROCEDURE — 25010000002 FENTANYL CITRATE (PF) 50 MCG/ML SOLUTION: Performed by: ANESTHESIOLOGY

## 2023-07-29 PROCEDURE — 59510 CESAREAN DELIVERY: CPT | Performed by: OBSTETRICS & GYNECOLOGY

## 2023-07-29 PROCEDURE — 86900 BLOOD TYPING SEROLOGIC ABO: CPT | Performed by: OBSTETRICS & GYNECOLOGY

## 2023-07-29 PROCEDURE — 25010000002 METOCLOPRAMIDE PER 10 MG: Performed by: ANESTHESIOLOGY

## 2023-07-29 PROCEDURE — 3E0234Z INTRODUCTION OF SERUM, TOXOID AND VACCINE INTO MUSCLE, PERCUTANEOUS APPROACH: ICD-10-PCS | Performed by: OBSTETRICS & GYNECOLOGY

## 2023-07-29 PROCEDURE — 25010000002 PHENYLEPHRINE 10 MG/ML SOLUTION: Performed by: ANESTHESIOLOGY

## 2023-07-29 PROCEDURE — 3E0P05Z INTRODUCTION OF ADHESION BARRIER INTO FEMALE REPRODUCTIVE, OPEN APPROACH: ICD-10-PCS | Performed by: OBSTETRICS & GYNECOLOGY

## 2023-07-29 PROCEDURE — 25010000002 ONDANSETRON PER 1 MG: Performed by: ANESTHESIOLOGY

## 2023-07-29 PROCEDURE — 59514 CESAREAN DELIVERY ONLY: CPT | Performed by: OBSTETRICS & GYNECOLOGY

## 2023-07-29 PROCEDURE — 25010000002 METHYLERGONOVINE MALEATE PER 0.2 MG: Performed by: OBSTETRICS & GYNECOLOGY

## 2023-07-29 PROCEDURE — 85461 HEMOGLOBIN FETAL: CPT | Performed by: OBSTETRICS & GYNECOLOGY

## 2023-07-29 PROCEDURE — 25010000002 KETOROLAC TROMETHAMINE PER 15 MG: Performed by: OBSTETRICS & GYNECOLOGY

## 2023-07-29 PROCEDURE — 86901 BLOOD TYPING SEROLOGIC RH(D): CPT | Performed by: OBSTETRICS & GYNECOLOGY

## 2023-07-29 PROCEDURE — 25010000002 CLINDAMYCIN 600 MG/50ML SOLUTION: Performed by: OBSTETRICS & GYNECOLOGY

## 2023-07-29 PROCEDURE — 25010000002 RHO D IMMUNE GLOBULIN 1500 UNIT/2ML SOLUTION PREFILLED SYRINGE: Performed by: OBSTETRICS & GYNECOLOGY

## 2023-07-29 RX ORDER — OXYCODONE HYDROCHLORIDE 10 MG/1
10 TABLET ORAL EVERY 4 HOURS PRN
Status: DISCONTINUED | OUTPATIENT
Start: 2023-07-29 | End: 2023-07-31 | Stop reason: HOSPADM

## 2023-07-29 RX ORDER — SODIUM CHLORIDE, SODIUM LACTATE, POTASSIUM CHLORIDE, CALCIUM CHLORIDE 600; 310; 30; 20 MG/100ML; MG/100ML; MG/100ML; MG/100ML
INJECTION, SOLUTION INTRAVENOUS CONTINUOUS PRN
Status: DISCONTINUED | OUTPATIENT
Start: 2023-07-29 | End: 2023-07-29 | Stop reason: SURG

## 2023-07-29 RX ORDER — ONDANSETRON 2 MG/ML
INJECTION INTRAMUSCULAR; INTRAVENOUS AS NEEDED
Status: DISCONTINUED | OUTPATIENT
Start: 2023-07-29 | End: 2023-07-29 | Stop reason: SURG

## 2023-07-29 RX ORDER — METHYLERGONOVINE MALEATE 0.2 MG/ML
200 INJECTION INTRAVENOUS ONCE AS NEEDED
Status: DISCONTINUED | OUTPATIENT
Start: 2023-07-29 | End: 2023-07-29 | Stop reason: HOSPADM

## 2023-07-29 RX ORDER — FAMOTIDINE 10 MG/ML
INJECTION, SOLUTION INTRAVENOUS AS NEEDED
Status: DISCONTINUED | OUTPATIENT
Start: 2023-07-29 | End: 2023-07-29 | Stop reason: SURG

## 2023-07-29 RX ORDER — OXYTOCIN/0.9 % SODIUM CHLORIDE 30/500 ML
PLASTIC BAG, INJECTION (ML) INTRAVENOUS AS NEEDED
Status: DISCONTINUED | OUTPATIENT
Start: 2023-07-29 | End: 2023-07-29 | Stop reason: SURG

## 2023-07-29 RX ORDER — SODIUM CHLORIDE 9 MG/ML
40 INJECTION, SOLUTION INTRAVENOUS AS NEEDED
Status: DISCONTINUED | OUTPATIENT
Start: 2023-07-29 | End: 2023-07-31 | Stop reason: HOSPADM

## 2023-07-29 RX ORDER — CARBOPROST TROMETHAMINE 250 UG/ML
250 INJECTION, SOLUTION INTRAMUSCULAR AS NEEDED
Status: DISCONTINUED | OUTPATIENT
Start: 2023-07-29 | End: 2023-07-31 | Stop reason: HOSPADM

## 2023-07-29 RX ORDER — SODIUM CHLORIDE 0.9 % (FLUSH) 0.9 %
1-10 SYRINGE (ML) INJECTION AS NEEDED
Status: DISCONTINUED | OUTPATIENT
Start: 2023-07-29 | End: 2023-07-29 | Stop reason: HOSPADM

## 2023-07-29 RX ORDER — DIPHENHYDRAMINE HCL 25 MG
25 CAPSULE ORAL EVERY 4 HOURS PRN
Status: DISCONTINUED | OUTPATIENT
Start: 2023-07-29 | End: 2023-07-31 | Stop reason: HOSPADM

## 2023-07-29 RX ORDER — OXYCODONE HYDROCHLORIDE 5 MG/1
5 TABLET ORAL EVERY 4 HOURS PRN
Status: DISCONTINUED | OUTPATIENT
Start: 2023-07-29 | End: 2023-07-31 | Stop reason: HOSPADM

## 2023-07-29 RX ORDER — OXYTOCIN/0.9 % SODIUM CHLORIDE 30/500 ML
85 PLASTIC BAG, INJECTION (ML) INTRAVENOUS ONCE
Status: DISCONTINUED | OUTPATIENT
Start: 2023-07-29 | End: 2023-07-29 | Stop reason: HOSPADM

## 2023-07-29 RX ORDER — ONDANSETRON 4 MG/1
4 TABLET, FILM COATED ORAL EVERY 6 HOURS PRN
Status: DISCONTINUED | OUTPATIENT
Start: 2023-07-29 | End: 2023-07-31 | Stop reason: HOSPADM

## 2023-07-29 RX ORDER — KETOROLAC TROMETHAMINE 30 MG/ML
30 INJECTION, SOLUTION INTRAMUSCULAR; INTRAVENOUS ONCE
Status: DISCONTINUED | OUTPATIENT
Start: 2023-07-29 | End: 2023-07-29 | Stop reason: SDUPTHER

## 2023-07-29 RX ORDER — OXYTOCIN/0.9 % SODIUM CHLORIDE 30/500 ML
650 PLASTIC BAG, INJECTION (ML) INTRAVENOUS ONCE
Status: DISCONTINUED | OUTPATIENT
Start: 2023-07-29 | End: 2023-07-29 | Stop reason: HOSPADM

## 2023-07-29 RX ORDER — FENTANYL CITRATE 50 UG/ML
INJECTION, SOLUTION INTRAMUSCULAR; INTRAVENOUS
Status: COMPLETED | OUTPATIENT
Start: 2023-07-29 | End: 2023-07-29

## 2023-07-29 RX ORDER — KETOROLAC TROMETHAMINE 15 MG/ML
15 INJECTION, SOLUTION INTRAMUSCULAR; INTRAVENOUS EVERY 6 HOURS
Status: COMPLETED | OUTPATIENT
Start: 2023-07-29 | End: 2023-07-30

## 2023-07-29 RX ORDER — METHYLERGONOVINE MALEATE 0.2 MG/ML
200 INJECTION INTRAVENOUS AS NEEDED
Status: DISCONTINUED | OUTPATIENT
Start: 2023-07-29 | End: 2023-07-31 | Stop reason: HOSPADM

## 2023-07-29 RX ORDER — CALCIUM CARBONATE 500 MG/1
1 TABLET, CHEWABLE ORAL EVERY 4 HOURS PRN
Status: DISCONTINUED | OUTPATIENT
Start: 2023-07-29 | End: 2023-07-29

## 2023-07-29 RX ORDER — HYDROCORTISONE 25 MG/G
1 CREAM TOPICAL AS NEEDED
Status: DISCONTINUED | OUTPATIENT
Start: 2023-07-29 | End: 2023-07-31 | Stop reason: HOSPADM

## 2023-07-29 RX ORDER — MISOPROSTOL 200 UG/1
800 TABLET ORAL AS NEEDED
Status: DISCONTINUED | OUTPATIENT
Start: 2023-07-29 | End: 2023-07-29 | Stop reason: HOSPADM

## 2023-07-29 RX ORDER — SODIUM CHLORIDE 0.9 % (FLUSH) 0.9 %
3 SYRINGE (ML) INJECTION EVERY 12 HOURS SCHEDULED
Status: DISCONTINUED | OUTPATIENT
Start: 2023-07-29 | End: 2023-07-31 | Stop reason: HOSPADM

## 2023-07-29 RX ORDER — METOCLOPRAMIDE HYDROCHLORIDE 5 MG/ML
INJECTION INTRAMUSCULAR; INTRAVENOUS AS NEEDED
Status: DISCONTINUED | OUTPATIENT
Start: 2023-07-29 | End: 2023-07-29 | Stop reason: SURG

## 2023-07-29 RX ORDER — KETOROLAC TROMETHAMINE 30 MG/ML
30 INJECTION, SOLUTION INTRAMUSCULAR; INTRAVENOUS ONCE
Status: DISCONTINUED | OUTPATIENT
Start: 2023-07-29 | End: 2023-07-29 | Stop reason: HOSPADM

## 2023-07-29 RX ORDER — TRISODIUM CITRATE DIHYDRATE AND CITRIC ACID MONOHYDRATE 500; 334 MG/5ML; MG/5ML
30 SOLUTION ORAL ONCE
Status: DISCONTINUED | OUTPATIENT
Start: 2023-07-29 | End: 2023-07-29 | Stop reason: HOSPADM

## 2023-07-29 RX ORDER — CARBOPROST TROMETHAMINE 250 UG/ML
250 INJECTION, SOLUTION INTRAMUSCULAR AS NEEDED
Status: DISCONTINUED | OUTPATIENT
Start: 2023-07-29 | End: 2023-07-29 | Stop reason: HOSPADM

## 2023-07-29 RX ORDER — DOCUSATE SODIUM 100 MG/1
100 CAPSULE, LIQUID FILLED ORAL 2 TIMES DAILY PRN
Status: DISCONTINUED | OUTPATIENT
Start: 2023-07-29 | End: 2023-07-29

## 2023-07-29 RX ORDER — MISOPROSTOL 200 UG/1
600 TABLET ORAL AS NEEDED
Status: DISCONTINUED | OUTPATIENT
Start: 2023-07-29 | End: 2023-07-31 | Stop reason: HOSPADM

## 2023-07-29 RX ORDER — ACETAMINOPHEN 500 MG
1000 TABLET ORAL ONCE
Status: DISCONTINUED | OUTPATIENT
Start: 2023-07-29 | End: 2023-07-29 | Stop reason: HOSPADM

## 2023-07-29 RX ORDER — PHENYLEPHRINE HYDROCHLORIDE 10 MG/ML
INJECTION INTRAVENOUS AS NEEDED
Status: DISCONTINUED | OUTPATIENT
Start: 2023-07-29 | End: 2023-07-29 | Stop reason: SURG

## 2023-07-29 RX ORDER — SIMETHICONE 80 MG
80 TABLET,CHEWABLE ORAL 4 TIMES DAILY PRN
Status: DISCONTINUED | OUTPATIENT
Start: 2023-07-29 | End: 2023-07-29

## 2023-07-29 RX ORDER — SIMETHICONE 80 MG
80 TABLET,CHEWABLE ORAL
Status: DISCONTINUED | OUTPATIENT
Start: 2023-07-29 | End: 2023-07-31 | Stop reason: HOSPADM

## 2023-07-29 RX ORDER — SODIUM CHLORIDE 0.9 % (FLUSH) 0.9 %
3-10 SYRINGE (ML) INJECTION AS NEEDED
Status: DISCONTINUED | OUTPATIENT
Start: 2023-07-29 | End: 2023-07-31 | Stop reason: HOSPADM

## 2023-07-29 RX ORDER — CARBOPROST TROMETHAMINE 250 UG/ML
250 INJECTION, SOLUTION INTRAMUSCULAR AS NEEDED
Status: DISCONTINUED | OUTPATIENT
Start: 2023-07-29 | End: 2023-07-29 | Stop reason: SDUPTHER

## 2023-07-29 RX ORDER — BUPIVACAINE HYDROCHLORIDE 7.5 MG/ML
INJECTION, SOLUTION INTRASPINAL
Status: COMPLETED | OUTPATIENT
Start: 2023-07-29 | End: 2023-07-29

## 2023-07-29 RX ORDER — LIDOCAINE HYDROCHLORIDE 10 MG/ML
5 INJECTION, SOLUTION EPIDURAL; INFILTRATION; INTRACAUDAL; PERINEURAL AS NEEDED
Status: DISCONTINUED | OUTPATIENT
Start: 2023-07-29 | End: 2023-07-29 | Stop reason: HOSPADM

## 2023-07-29 RX ORDER — SODIUM CHLORIDE, SODIUM LACTATE, POTASSIUM CHLORIDE, CALCIUM CHLORIDE 600; 310; 30; 20 MG/100ML; MG/100ML; MG/100ML; MG/100ML
125 INJECTION, SOLUTION INTRAVENOUS CONTINUOUS
Status: DISCONTINUED | OUTPATIENT
Start: 2023-07-29 | End: 2023-07-29

## 2023-07-29 RX ORDER — DOCUSATE SODIUM 100 MG/1
100 CAPSULE, LIQUID FILLED ORAL 2 TIMES DAILY
Status: DISCONTINUED | OUTPATIENT
Start: 2023-07-29 | End: 2023-07-31 | Stop reason: HOSPADM

## 2023-07-29 RX ORDER — IBUPROFEN 600 MG/1
600 TABLET ORAL EVERY 6 HOURS
Status: DISCONTINUED | OUTPATIENT
Start: 2023-07-30 | End: 2023-07-31 | Stop reason: HOSPADM

## 2023-07-29 RX ORDER — SODIUM CHLORIDE 0.9 % (FLUSH) 0.9 %
10 SYRINGE (ML) INJECTION EVERY 12 HOURS SCHEDULED
Status: DISCONTINUED | OUTPATIENT
Start: 2023-07-29 | End: 2023-07-29 | Stop reason: HOSPADM

## 2023-07-29 RX ORDER — OXYTOCIN/0.9 % SODIUM CHLORIDE 30/500 ML
250 PLASTIC BAG, INJECTION (ML) INTRAVENOUS CONTINUOUS
Status: DISCONTINUED | OUTPATIENT
Start: 2023-07-29 | End: 2023-07-29

## 2023-07-29 RX ORDER — ACETAMINOPHEN 500 MG
1000 TABLET ORAL EVERY 6 HOURS
Status: COMPLETED | OUTPATIENT
Start: 2023-07-29 | End: 2023-07-30

## 2023-07-29 RX ORDER — NICOTINE 21 MG/24HR
1 PATCH, TRANSDERMAL 24 HOURS TRANSDERMAL EVERY 24 HOURS
Status: DISCONTINUED | OUTPATIENT
Start: 2023-07-29 | End: 2023-07-29

## 2023-07-29 RX ORDER — ONDANSETRON 2 MG/ML
4 INJECTION INTRAMUSCULAR; INTRAVENOUS EVERY 6 HOURS PRN
Status: DISCONTINUED | OUTPATIENT
Start: 2023-07-29 | End: 2023-07-31 | Stop reason: HOSPADM

## 2023-07-29 RX ORDER — ACETAMINOPHEN 325 MG/1
650 TABLET ORAL EVERY 6 HOURS
Status: DISCONTINUED | OUTPATIENT
Start: 2023-07-30 | End: 2023-07-31 | Stop reason: HOSPADM

## 2023-07-29 RX ORDER — METHYLERGONOVINE MALEATE 0.2 MG/ML
200 INJECTION INTRAVENOUS ONCE AS NEEDED
Status: DISCONTINUED | OUTPATIENT
Start: 2023-07-29 | End: 2023-07-29 | Stop reason: SDUPTHER

## 2023-07-29 RX ORDER — OXYTOCIN/0.9 % SODIUM CHLORIDE 30/500 ML
999 PLASTIC BAG, INJECTION (ML) INTRAVENOUS ONCE
Status: COMPLETED | OUTPATIENT
Start: 2023-07-29 | End: 2023-07-29

## 2023-07-29 RX ORDER — ALUMINA, MAGNESIA, AND SIMETHICONE 2400; 2400; 240 MG/30ML; MG/30ML; MG/30ML
15 SUSPENSION ORAL EVERY 4 HOURS PRN
Status: DISCONTINUED | OUTPATIENT
Start: 2023-07-29 | End: 2023-07-29

## 2023-07-29 RX ORDER — MISOPROSTOL 200 UG/1
800 TABLET ORAL AS NEEDED
Status: DISCONTINUED | OUTPATIENT
Start: 2023-07-29 | End: 2023-07-29 | Stop reason: SDUPTHER

## 2023-07-29 RX ADMIN — METOCLOPRAMIDE 10 MG: 5 INJECTION, SOLUTION INTRAMUSCULAR; INTRAVENOUS at 01:20

## 2023-07-29 RX ADMIN — BUPIVACAINE HYDROCHLORIDE IN DEXTROSE 1.6 ML: 7.5 INJECTION, SOLUTION SUBARACHNOID at 01:18

## 2023-07-29 RX ADMIN — DOCUSATE SODIUM 100 MG: 100 CAPSULE, LIQUID FILLED ORAL at 10:23

## 2023-07-29 RX ADMIN — KETOROLAC TROMETHAMINE 15 MG: 15 INJECTION, SOLUTION INTRAMUSCULAR; INTRAVENOUS at 10:23

## 2023-07-29 RX ADMIN — METHYLERGONOVINE MALEATE 200 MCG: 0.2 INJECTION, SOLUTION INTRAMUSCULAR; INTRAVENOUS at 03:30

## 2023-07-29 RX ADMIN — SIMETHICONE 80 MG: 80 TABLET, CHEWABLE ORAL at 18:40

## 2023-07-29 RX ADMIN — PHENYLEPHRINE HYDROCHLORIDE 300 MCG: 10 INJECTION, SOLUTION INTRAVENOUS at 01:23

## 2023-07-29 RX ADMIN — SIMETHICONE 80 MG: 80 TABLET, CHEWABLE ORAL at 07:49

## 2023-07-29 RX ADMIN — DOCUSATE SODIUM 100 MG: 100 CAPSULE, LIQUID FILLED ORAL at 22:26

## 2023-07-29 RX ADMIN — KETOROLAC TROMETHAMINE 15 MG: 15 INJECTION, SOLUTION INTRAMUSCULAR; INTRAVENOUS at 15:51

## 2023-07-29 RX ADMIN — Medication 999 ML/HR: at 03:30

## 2023-07-29 RX ADMIN — SODIUM CITRATE AND CITRIC ACID MONOHYDRATE 30 ML: 500; 334 SOLUTION ORAL at 00:55

## 2023-07-29 RX ADMIN — HUMAN RHO(D) IMMUNE GLOBULIN 1500 UNITS: 1500 SOLUTION INTRAMUSCULAR; INTRAVENOUS at 22:27

## 2023-07-29 RX ADMIN — ONDANSETRON 4 MG: 2 INJECTION INTRAMUSCULAR; INTRAVENOUS at 01:20

## 2023-07-29 RX ADMIN — ACETAMINOPHEN 1000 MG: 500 TABLET ORAL at 18:40

## 2023-07-29 RX ADMIN — KETOROLAC TROMETHAMINE 30 MG: 30 INJECTION, SOLUTION INTRAMUSCULAR; INTRAVENOUS at 03:15

## 2023-07-29 RX ADMIN — FAMOTIDINE 20 MG: 10 INJECTION, SOLUTION INTRAVENOUS at 01:20

## 2023-07-29 RX ADMIN — Medication 500 ML: at 01:43

## 2023-07-29 RX ADMIN — SODIUM CHLORIDE, POTASSIUM CHLORIDE, SODIUM LACTATE AND CALCIUM CHLORIDE 1000 ML: 600; 310; 30; 20 INJECTION, SOLUTION INTRAVENOUS at 00:00

## 2023-07-29 RX ADMIN — MISOPROSTOL 800 MCG: 200 TABLET ORAL at 04:20

## 2023-07-29 RX ADMIN — ACETAMINOPHEN 1000 MG: 500 TABLET ORAL at 12:24

## 2023-07-29 RX ADMIN — ACETAMINOPHEN 1000 MG: 500 TABLET ORAL at 06:55

## 2023-07-29 RX ADMIN — KETOROLAC TROMETHAMINE 15 MG: 15 INJECTION, SOLUTION INTRAMUSCULAR; INTRAVENOUS at 22:32

## 2023-07-29 RX ADMIN — CLINDAMYCIN PHOSPHATE 600 MG: 600 INJECTION, SOLUTION INTRAVENOUS at 00:15

## 2023-07-29 RX ADMIN — FENTANYL CITRATE 25 MCG: 50 INJECTION, SOLUTION INTRAMUSCULAR; INTRAVENOUS at 01:18

## 2023-07-29 RX ADMIN — Medication 3 ML: at 10:58

## 2023-07-29 RX ADMIN — SODIUM CHLORIDE, POTASSIUM CHLORIDE, SODIUM LACTATE AND CALCIUM CHLORIDE: 600; 310; 30; 20 INJECTION, SOLUTION INTRAVENOUS at 00:57

## 2023-07-29 RX ADMIN — SIMETHICONE 80 MG: 80 TABLET, CHEWABLE ORAL at 12:24

## 2023-07-29 RX ADMIN — SIMETHICONE 80 MG: 80 TABLET, CHEWABLE ORAL at 22:26

## 2023-07-29 NOTE — ANESTHESIA PREPROCEDURE EVALUATION
Anesthesia Evaluation     Patient summary reviewed and Nursing notes reviewed                Airway   Mallampati: I  TM distance: >3 FB  Neck ROM: full  No difficulty expected  Dental - normal exam     Pulmonary - negative pulmonary ROS and normal exam   Cardiovascular - normal exam    (+) dysrhythmias Tachycardia      Neuro/Psych- negative ROS  GI/Hepatic/Renal/Endo - negative ROS     Musculoskeletal (-) negative ROS    Abdominal  - normal exam    Bowel sounds: normal.   Substance History - negative use     OB/GYN    (+) Pregnant        Other                      Anesthesia Plan    ASA 2 - emergent     spinal       Anesthetic plan, risks, benefits, and alternatives have been provided, discussed and informed consent has been obtained with: patient.    Use of blood products discussed with patient .    Plan discussed with attending.    CODE STATUS:    Level Of Support Discussed With: Patient  Code Status (Patient has no pulse and is not breathing): CPR (Attempt to Resuscitate)  Medical Interventions (Patient has pulse or is breathing): Full Support  Release to patient: Routine Release

## 2023-07-29 NOTE — H&P
Nunu Youssef Bolton  1990  [id number]  [id number]    CC: contractions  HPI:  Patient is 33 y.o. female   currently at 38w6d presents with c/o uterine contractions, rates 5-6/10.  No assoc bleeding or leaking.  PT also c/o decreased FM.  PNC comp by transverse lie and poss fetal macrosomia.  Pt is sched for  delivery.     PMH:  Current meds: PNV, Fe  Illnesses: anemia, SVT  Surgeries: ruptured appendix, lap roni, breast reduct  Allergies: morphine/phenergan combo- anaphylaxis       Cefzil- rash    Past OB History:       OB History    Para Term  AB Living   2 0 0 0 1 0   SAB IAB Ectopic Molar Multiple Live Births   1 0 0 0 0 0      # Outcome Date GA Lbr Koko/2nd Weight Sex Delivery Anes PTL Lv   2 Current            1 2018     SAB               SH: tob neg , EtOH neg, drugs neg    General ROS: contractions, HA (resolved), edema.  All other systems reviewed and are negative.      Physical Examination: General appearance - alert, well appearing, and in no distress  Vital signs - LMP 10/29/2022   HEENT: normocephalic, atraumatic,oropharynx clear, appearance of ears and nose normal  Neck - supple, no significant adenopathy, no thyromegaly  Lymphatics - no palpable lymphadenopathy in the neck or groin, no hepatosplenomegaly  Chest - clear to auscultation, no wheezes, rales or rhonchi, respiratory effort non-labored  Heart - normal rate, regular rhythm, no murmurs, rubs, clicks or gallops, no JVD, trace lower extremity edema  Abdomen - soft, nontender, nondistended, no masses, no hepatosplenomegaly  no rebound tenderness noted, bowel sounds normal  Vaginal Exam: 6/70%/-2, vertex ,external genitalia normal  Extremities - trace pedal edema noted, no calf tend  Skin -warm and dry, normal coloration and turgor, no rashes, no suspicious skin lesions noted    Fetal monitoring: indication contractions , onset 2230 , offset 2310 , baseline 135 , mod BTB variability , multiple accels (15 X  15), no decels, irreg contractions, interpretation reactive NST    Radiology     Assessment 1)IUP 38 6/7 weeks   2)labor   3)desires  delivery (pt was previously transverse).  Pt understands baby    Is now vertex, but still desires .  Pt understands  has a    Greater risk of bleeding and infection    Plan 1)admit   2)    Trevor Sen MD  2023  23:07 EDT

## 2023-07-29 NOTE — ANESTHESIA POSTPROCEDURE EVALUATION
Patient: Nunu Morgan    Procedure Summary       Date: 23 Room / Location: [facility] LABOR DELIVERY   [facility]    Anesthesia Start: 107 Anesthesia Stop: 206    Procedure:  SECTION PRIMARY (Abdomen) Diagnosis:       Delivery by elective  section      (Delivery by elective  section [O82])    Surgeons: Marco Dobbins MD Provider: Vadim Rowe MD    Anesthesia Type: spinal ASA Status: 2 - Emergent            Anesthesia Type: spinal    Vitals  No vitals data found for the desired time range.          Post Anesthesia Care and Evaluation    Patient location during evaluation: bedside  Patient participation: complete - patient participated  Level of consciousness: awake and alert  Pain score: 0  Pain management: adequate    Airway patency: patent  Anesthetic complications: No anesthetic complications    Cardiovascular status: acceptable  Respiratory status: acceptable  Hydration status: acceptable

## 2023-07-29 NOTE — ANESTHESIA POSTPROCEDURE EVALUATION
Patient: Nunu Morgan    Procedure Summary       Date: 23 Room / Location: [facility] LABOR DELIVERY   [facility]    Anesthesia Start: 107 Anesthesia Stop: 206    Procedure:  SECTION PRIMARY (Abdomen) Diagnosis:       Delivery by elective  section      (Delivery by elective  section [O82])    Surgeons: Marco Dobbins MD Provider: Vadim Rowe MD    Anesthesia Type: spinal ASA Status: 2 - Emergent            Anesthesia Type: spinal    Vitals  Vitals Value Taken Time   /71 23 1215   Temp 98.5 °F (36.9 °C) 23 1215   Pulse 97 23 1215   Resp 18 23 1215   SpO2 100 % 23 0517   Vitals shown include unvalidated device data.        Post Anesthesia Care and Evaluation    Patient location during evaluation: bedside  Patient participation: complete - patient participated  Level of consciousness: awake  Pain score: 4  Pain management: satisfactory to patient    Airway patency: patent  Anesthetic complications: No anesthetic complications  PONV Status: none  Cardiovascular status: acceptable and hemodynamically stable  Respiratory status: acceptable  Hydration status: acceptable  Post Neuraxial Block status: Motor and sensory function returned to baseline and No signs or symptoms of PDPH

## 2023-07-29 NOTE — OP NOTE
Mykel Morgan  : 1990  MRN: [id number]  CSN: [id number]    Operative Report    Pre-Operative Dx:   Intrauterine pregnancy at 39w0d weeks   Active labor      Postoperative dx:    Same as above     Procedure: Surgical Procedures Since Admission:  Procedure(s):   SECTION PRIMARY  Surgeon:  Marco Dobbins MD  Status:  Day of Surgery  -------------------       Type of Delivery:  Primary  (LTCS)       Surgeon: Farhat Lara MD    Assistant: Trevor Sen MD was responsible for performing the following activities: Retraction, Suction, Irrigation, Suturing, Closing, and Delivery of Fetus and their skilled assistance was necessary for the success of this case.         Anesthesia:  Spinal       EBL: 800 mls.       Antibiotics: gentamicin 5 mg/kg and azithromycin 500 mgs     Drains: Contreras     Findings: Female Infant  Weight: pending  Apgar Scores: pending  There were adhesions of bilateral uterine tubes to the posterior aspect of the uterus.         Indications: This patient is a 33-year-old  2 para 0 who presents at 39 weeks and active labor who desires elective  section.           Procedure Details:   After the appropriate time out, the patient was prepped and draped in the usual sterile fashion.  She had been placed in the dorsal supine left lateral tilt position.  A contreras catheter had been placed in the bladder for drainage during the procedure. A pfannenstiel skin incision was made with a knife and carried down to the fascia.  The fascia was nicked with a scalpel and the incision was extended bilaterally with curved Hamm scissors. The superior aspect of the fascia was grasped with Kocher clamps x 2 and bluntly as well as sharply dissected away from the underlying rectus muscles.  A similar process was repeated inferiorly. The rectus muscles were  and the peritoneal cavity was entered sharply without complications. The bladder flap was created  with Metzenbaum scissors and pickups with teeth.  The  retractor was placed and after checking for no entrapment, was retracted down.  A transverse uterine incision was made with the knife and extended bilaterally.  The infant was delivered from the vertex presentation.  The mouth and nose were bulb suctioned.  The cord was doubly clamped and cut and the infant handed to the pediatric nurse in attendance.  Cord blood was obtained.  The placenta was manually extracted from the uterus.  The uterus was then elevated from the abdomen and wiped free of remaining membranes.  The uterine incision was closed with #1 chromic in a running locking fashion.  The uterus had been returned to the abdomen.  The lateral gutters were wiped free of remaining clots.  The site of surgical incision was inspected and noted to be hemostatic. The  retractor was removed.  Interceed was placed over the uterine incision. The subfascial tissue was inspected and noted to be hemostatic.  The peritoneum was closed with #1 Chronic in a running continuous fashion. The fascia was closed with 0 Vicryl in a running non-locking fashion in two segments.  Subcutaneous tissue was inspected and noted to be hemostatic with minimal bovie electrocautery.  Melany's fascia was closed with 3-0 Plain in a running non-locking fashion.  The skin was approximated with Subcutaneous absorbable staples. Dressings were placed.  All instrument and sponge counts were correct at the end of the procedure. The patient tolerated the procedure well.    She was taken to postoperative recovery room in stable condition.          Complications:   None      Disposition:   Mother to Mother Baby/Postpartum  in stable condition currently.   Baby to NBN  in stable condition currently.     Farhat Lara MD   2023  02:09 EDT

## 2023-07-29 NOTE — ANESTHESIA PROCEDURE NOTES
Spinal Block      Patient reassessed immediately prior to procedure    Patient location during procedure: OR  Start Time: 7/29/2023 1:18 AM  Stop Time: 7/29/2023 1:18 AM  Indication:at surgeon's request  Performed By  Anesthesiologist: Vadim Rowe MD  Preanesthetic Checklist  Completed: patient identified, IV checked, site marked, risks and benefits discussed, surgical consent, monitors and equipment checked, pre-op evaluation and timeout performed  Spinal Block Prep:  Patient Position:sitting  Sterile Tech:cap, gloves, sterile barriers and mask  Prep:Betadine  Patient Monitoring:EKG, continuous pulse oximetry and blood pressure monitoring    Spinal Block Procedure  Approach:midline  Guidance:palpation technique  Location:L2-L3  Needle Type:Xander  Needle Gauge:25 G  Placement of Spinal needle event:cerebrospinal fluid aspirated  Paresthesia: no  Fluid Appearance:clear  Medications: bupivacaine in dextrose (MARCAINE SPINAL / Hyberbaric) 0.75-8.25 % injection - Intrathecal   1.6 mL - 7/29/2023 1:18:00 AM  fentaNYL citrate (PF) (SUBLIMAZE) injection - Intrathecal   25 mcg - 7/29/2023 1:18:00 AM   Post Assessment  Patient Tolerance:patient tolerated the procedure well with no apparent complications  Complications no

## 2023-07-30 LAB
HCT VFR BLD AUTO: 32.8 % (ref 34–46.6)
HGB BLD-MCNC: 10.5 G/DL (ref 12–15.9)

## 2023-07-30 PROCEDURE — 85014 HEMATOCRIT: CPT | Performed by: OBSTETRICS & GYNECOLOGY

## 2023-07-30 PROCEDURE — 85018 HEMOGLOBIN: CPT | Performed by: OBSTETRICS & GYNECOLOGY

## 2023-07-30 PROCEDURE — 0503F POSTPARTUM CARE VISIT: CPT

## 2023-07-30 PROCEDURE — 25010000002 KETOROLAC TROMETHAMINE PER 15 MG: Performed by: OBSTETRICS & GYNECOLOGY

## 2023-07-30 RX ADMIN — IBUPROFEN 600 MG: 600 TABLET, FILM COATED ORAL at 15:50

## 2023-07-30 RX ADMIN — ACETAMINOPHEN 1000 MG: 500 TABLET ORAL at 00:11

## 2023-07-30 RX ADMIN — IBUPROFEN 600 MG: 600 TABLET, FILM COATED ORAL at 09:44

## 2023-07-30 RX ADMIN — SIMETHICONE 80 MG: 80 TABLET, CHEWABLE ORAL at 07:23

## 2023-07-30 RX ADMIN — SIMETHICONE 80 MG: 80 TABLET, CHEWABLE ORAL at 12:01

## 2023-07-30 RX ADMIN — KETOROLAC TROMETHAMINE 15 MG: 15 INJECTION, SOLUTION INTRAMUSCULAR; INTRAVENOUS at 04:50

## 2023-07-30 RX ADMIN — ACETAMINOPHEN 650 MG: 325 TABLET ORAL at 06:03

## 2023-07-30 RX ADMIN — DOCUSATE SODIUM 100 MG: 100 CAPSULE, LIQUID FILLED ORAL at 21:25

## 2023-07-30 RX ADMIN — IBUPROFEN 600 MG: 600 TABLET, FILM COATED ORAL at 21:25

## 2023-07-30 RX ADMIN — DOCUSATE SODIUM 100 MG: 100 CAPSULE, LIQUID FILLED ORAL at 07:23

## 2023-07-30 RX ADMIN — ACETAMINOPHEN 650 MG: 325 TABLET ORAL at 12:01

## 2023-07-30 RX ADMIN — ACETAMINOPHEN 650 MG: 325 TABLET ORAL at 17:47

## 2023-07-30 RX ADMIN — SIMETHICONE 80 MG: 80 TABLET, CHEWABLE ORAL at 21:25

## 2023-07-30 NOTE — PROGRESS NOTES
2023    Name:Nunu Morgan    MR#:[id number]     PROGRESS NOTE:  Post-Op Day 1 S/P    HD:2    Subjective   33 y.o. yo Female  s/p CS at 39w0d doing well. Pain well controlled. Tolerating regular diet and having flatus. Lochia normal.       Postpartum care following C/S 23 - Nilda        Objective    Vitals  Temp:  Temp:  [97.6 øF (36.4 øC)-98.9 øF (37.2 øC)] 98.2 øF (36.8 øC)  Temp src: Oral  BP:  BP: (102-126)/(59-80) 125/80  Pulse:  Heart Rate:  [77-90] 80  RR:   Resp:  [16-18] 16    General Awake, alert, no distress  Abdomen Soft, non-distended, fundus firm, below umbilicus, appropriately tender  Incision  Intact, no erythema or exudate  Extremities Calves NT bilaterally     I/O last 3 completed shifts:  In: 1650 [I.V.:1650]  Out: 5657 [Urine:4775; Blood:882]    LABS:   Lab Results   Component Value Date    WBC 10.25 2023    HGB 10.5 (L) 2023    HCT 32.8 (L) 2023    MCV 86.3 2023     2023       Infant: female       Assessment   1.  POD 1, PCS-transverse lie and poss fetal macrosomia. Doing well.    2.  MBT RhNeg, BBT RHNeg- Rhogam complete 23    Plan:    1.  Doing well.  Routine postoperative care. Advance.       Active Problems:   None      Kayleen Roland, APRN  2023 13:27 EDT

## 2023-07-31 PROCEDURE — 0503F POSTPARTUM CARE VISIT: CPT | Performed by: OBSTETRICS & GYNECOLOGY

## 2023-07-31 RX ORDER — PSEUDOEPHEDRINE HCL 30 MG
100 TABLET ORAL 2 TIMES DAILY PRN
Qty: 60 CAPSULE | Refills: 1 | Status: SHIPPED | OUTPATIENT
Start: 2023-07-31

## 2023-07-31 RX ORDER — FERROUS SULFATE 325(65) MG
325 TABLET ORAL
Qty: 90 TABLET | Refills: 0 | Status: SHIPPED | OUTPATIENT
Start: 2023-07-31 | End: 2023-10-29

## 2023-07-31 RX ORDER — OXYCODONE HYDROCHLORIDE 5 MG/1
5 TABLET ORAL EVERY 6 HOURS PRN
Qty: 10 TABLET | Refills: 0 | Status: SHIPPED | OUTPATIENT
Start: 2023-07-31 | End: 2023-08-05

## 2023-07-31 RX ORDER — IBUPROFEN 600 MG/1
600 TABLET ORAL EVERY 6 HOURS PRN
Qty: 60 TABLET | Refills: 1 | Status: SHIPPED | OUTPATIENT
Start: 2023-07-31

## 2023-07-31 RX ADMIN — IBUPROFEN 600 MG: 600 TABLET, FILM COATED ORAL at 09:04

## 2023-07-31 RX ADMIN — ACETAMINOPHEN 650 MG: 325 TABLET ORAL at 00:16

## 2023-07-31 RX ADMIN — DOCUSATE SODIUM 100 MG: 100 CAPSULE, LIQUID FILLED ORAL at 09:04

## 2023-07-31 RX ADMIN — SIMETHICONE 80 MG: 80 TABLET, CHEWABLE ORAL at 06:34

## 2023-07-31 RX ADMIN — ACETAMINOPHEN 650 MG: 325 TABLET ORAL at 06:33

## 2023-07-31 RX ADMIN — IBUPROFEN 600 MG: 600 TABLET, FILM COATED ORAL at 04:43

## 2023-07-31 NOTE — DISCHARGE SUMMARY
Discharge Summary     Mykel Morgan  : 1990  MRN: [id number]  CSN: [id number]    Date of Admission: 2023   Date of Discharge:  2023   Delivering Physician: Marco Dobbins MD       Admission Diagnosis: Pregnancy at 39w0d     Discharge Diagnosis: Same as above plus  Pregnancy at 39w0d - delivered  Malpresentation: transverse lie       Procedures: Primary  (LTCS)     Hospital Course  See the completed operative report for details regarding her delivery.    Her post-operative course was unremarkable.  On POD # 2 she felt like she ready ready for D/C.  She was evaluated by Dr. Harrington who agreed she was able to be discharged to home.  She had no febrile morbidity. She had normal bowel and bladder function and was hemodynamically stable.  Her wound was healing well without obvious signs of infections.    Infant  female  fetus weighing 3015 g (6 lb 10.4 oz)   Apgars -  5 @ 1 minute /  7 @ 5 minutes.    Discharge labs  Lab Results   Component Value Date    WBC 10.25 2023    HGB 10.5 (L) 2023    HCT 32.8 (L) 2023     2023       Discharge Medications     Discharge Medications        New Medications        Instructions Start Date   docusate sodium 100 MG capsule   100 mg, Oral, 2 Times Daily PRN      ibuprofen 600 MG tablet  Commonly known as: ADVIL,MOTRIN   600 mg, Oral, Every 6 Hours PRN      oxyCODONE 5 MG immediate release tablet  Commonly known as: ROXICODONE   5 mg, Oral, Every 6 Hours PRN             Changes to Medications        Instructions Start Date   ferrous sulfate 325 (65 FE) MG tablet  What changed: when to take this   325 mg, Oral, Daily With Breakfast             Continue These Medications        Instructions Start Date   prenatal (CLASSIC) vitamin  tablet  Generic drug: prenatal vitamin   Oral, Daily               Discharge Disposition Home or Self Care   Condition on Discharge: good   Follow-up: 2 weeks with Dr. Dobbins      Shannan Harrington MD  7/31/2023

## 2023-07-31 NOTE — PROGRESS NOTES
Mykel Morgan  : 1990  MRN: [id number]  CSN: [id number]    Post-operative Day #2  CC: routine postpartum care   Subjective   Her pain is well controlled. Vaginal bleeding is normal in amount. She is ambulating without difficulty. She is passing gas. Her bowels are working normally. She is voiding without difficulty. Her baby is doing well.  She wants to go home today      Objective     Min/max vitals past 24 hours:   Temp  Min: 98.2 øF (36.8 øC)  Max: 99 øF (37.2 øC)  BP  Min: 119/78  Max: 129/77  Pulse  Min: 80  Max: 95  Resp  Min: 16  Max: 18        General: well developed; well nourished  no acute distress   Abdomen: soft, non-tender; no masses  no umbilical or inguinal hernias are present  no hepato-splenomegaly  incision is clean, dry, intact, and without drainage   Pelvic: Not performed   Ext: Calves NT     Results from last 7 days   Lab Units 23  0616 23  2331   WBC 10*3/mm3  --  10.25   HEMOGLOBIN g/dL 10.5* 13.7   HEMATOCRIT % 32.8* 40.9   PLATELETS 10*3/mm3  --  151     Lab Results   Component Value Date    RH Negative 2023    HEPBSAG Non-Reactive 2023        Assessment   POD #2 S/P Primary  (LTCS)  Doing well     Plan   Continue routine post-operative care  Discharge to home  Advised no tampons or intercourse for 6 weeks.  D/C questions all answered  Follow-up appointment in 2 week(s)  Explained need for oral iron for 2-3 months    Shannan Harrington MD  2023  08:35 EDT

## 2023-08-01 VITALS
SYSTOLIC BLOOD PRESSURE: 129 MMHG | RESPIRATION RATE: 18 BRPM | OXYGEN SATURATION: 97 % | HEART RATE: 94 BPM | DIASTOLIC BLOOD PRESSURE: 77 MMHG | TEMPERATURE: 98.6 F

## 2023-08-04 ENCOUNTER — TELEPHONE (OUTPATIENT)
Dept: OBSTETRICS AND GYNECOLOGY | Facility: CLINIC | Age: 33
End: 2023-08-04
Payer: COMMERCIAL

## 2023-08-04 NOTE — TELEPHONE ENCOUNTER
TOBY    Pt called stating that she is not able to breast feed, both of her breasts are really engorged and she's been leaking some milk out. She is taking ibuprofen to help out with the pain. She is wondering if there is something Dr maher suggest to help out with this. Please advise.

## 2023-08-07 NOTE — TELEPHONE ENCOUNTER
Called and spoke with patient, informed her of Dr. Dobbins' advisement - she verified understanding and will reach out to a lactation specialist.

## 2023-08-10 ENCOUNTER — TELEPHONE (OUTPATIENT)
Dept: OBSTETRICS AND GYNECOLOGY | Facility: CLINIC | Age: 33
End: 2023-08-10
Payer: COMMERCIAL

## 2023-08-10 NOTE — TELEPHONE ENCOUNTER
Rash started 3 days ago.  Redness and very bumpy.  Itchy also.   Incision looks fine.  Having reaction to the skin glue of the surrounding area.

## 2023-08-10 NOTE — TELEPHONE ENCOUNTER
TOBY    Pt called stating that she had a c/s 7/29 states that she is having a reaction around incision cite to dermabond. There is a rash, very itchy and raised skin. She would like to know what the best treat for this would be. Please advise.

## 2023-08-10 NOTE — TELEPHONE ENCOUNTER
At this point the most important for step would be to remove the skin glue.  After that if it still persists would use topical hydrocortisone cream 3 times a day

## 2023-08-12 NOTE — PROGRESS NOTES
Subjective   Chief Complaint   Patient presents with    Post-op     Nunu Morgan is a 33 y.o. year old  presenting to be seen for her post-operative visit.  Currently she reports no problems with eating, bowel movements, voiding, or wound drainage and pain is well controlled.     The pathology results from her procedure are in Nunu's record and are benign.        The following portions of the patient's history were reviewed and updated as appropriate:current medications and allergies       Objective   /78   Resp 14   Wt 85.3 kg (188 lb)   LMP 10/29/2022   Breastfeeding No   BMI 30.34 kg/mý     General:  well developed; well nourished  no acute distress   Abdomen: soft, non-tender; no masses  no umbilical or inguinal hernias are present  no hepato-splenomegaly  incision is clean, dry, intact, and without drainage   Pelvis: Not performed.          Assessment   S/P      Plan   OK to drive  OK to lift  Nothing per vagina still until 6 weeks after her procedure  Prineo removed  The importance of keeping all planned follow-up and taking all medications as prescribed was emphasized.  Follow up for postpartum visit @ 6weeks .           This note was electronically signed.    Marco Dobbins M.D.  2023    Part of this note may be an electronic transcription/translation of spoken language to printed text using the Dragon Dictation System.

## 2023-08-17 ENCOUNTER — POSTPARTUM VISIT (OUTPATIENT)
Dept: OBSTETRICS AND GYNECOLOGY | Facility: CLINIC | Age: 33
End: 2023-08-17
Payer: COMMERCIAL

## 2023-08-17 VITALS
RESPIRATION RATE: 14 BRPM | BODY MASS INDEX: 30.34 KG/M2 | WEIGHT: 188 LBS | DIASTOLIC BLOOD PRESSURE: 78 MMHG | SYSTOLIC BLOOD PRESSURE: 120 MMHG

## 2023-08-17 DIAGNOSIS — Z98.890 POST-OPERATIVE STATE: Primary | ICD-10-CM

## 2023-08-17 PROCEDURE — 99024 POSTOP FOLLOW-UP VISIT: CPT | Performed by: OBSTETRICS & GYNECOLOGY

## 2023-09-01 ENCOUNTER — TELEPHONE (OUTPATIENT)
Dept: OBSTETRICS AND GYNECOLOGY | Facility: CLINIC | Age: 33
End: 2023-09-01
Payer: COMMERCIAL

## 2023-09-01 NOTE — LETTER
September 1, 2023     Patient: Nunu Morgan   YOB: 1990   Date of Visit: 9/1/2023       To Whom It May Concern:    It is my medical opinion that Nunu Morgan may return to work on 9/25/2023 without restrictions .           Sincerely,        Marco Dobbins MD

## 2023-09-05 NOTE — PROGRESS NOTES
Subjective   Chief Complaint   Patient presents with    Postpartum Care     Nunu Morgan is a 33 y.o. year old  presenting to be seen for her postpartum visit.  She had a .  Her daughter is doing well.    She does not have concerns about post-partum blues/depression.   Brundidge Score = 3  She is bottle feeding.  Since delivery she has not been sexually active.  For ongoing contraception, her plans are undecided.    The following portions of the patient's history were reviewed and updated as appropriate:current medications and allergies    Social History    Tobacco Use      Smoking status: Never      Smokeless tobacco: Never      Review of Systems  Constitutional POS: nothing reported    NEG: anorexia or night sweats   Genitourinary POS: nothing reported    NEG: dysuria or hematuria      Gastointestinal POS: nothing reported    NEG: bloating, change in bowel habits, melena, or reflux symptoms   Breast POS: nothing reported    NEG: persistent breast lump, skin dimpling, or nipple discharge        Objective   /76   Resp 14   Wt 88.9 kg (196 lb)   LMP 10/29/2022   Breastfeeding No   BMI 31.64 kg/m²     General:  well developed; well nourished  no acute distress   Abdomen: soft, non-tender; no masses  no umbilical or inguinal hernias are present  no hepato-splenomegaly  incision is clean, dry, intact, and without drainage   Pelvis: Clinical staff was present for exam  External genitalia:  normal appearance of the external genitalia including Bartholin's and [place]'s glands.  :  urethral meatus normal;  Vaginal:  normal pink mucosa without prolapse or lesions.  Cervix:  normal appearance.  Uterus:  normal size, shape and consistency.  Adnexa:  normal bimanual exam of the adnexa.  Rectal:  digital rectal exam not performed; anus visually normal appearing.          Assessment   Normal 6 week postpartum exam S/P      Plan   BC options reviewed and compared today: IUD - Mirena and  OCP (estrogen/progesterone)  Have reviewed that ideally enter pregnancy interval no sooner than 18 months between deliveries, preferably 24 months if possible  The importance of keeping all planned follow-up and taking all medications as prescribed was emphasized.  Follow up for annual exam 1 year           This note was electronically signed.    Marco Dobbins M.D.  September 12, 2023    Part of this note may be an electronic transcription/translation of spoken language to printed text using the Dragon Dictation System.

## 2023-09-06 NOTE — TELEPHONE ENCOUNTER
Called attempting to reach patient; no answer, unable to LVM requesting call back and provided office call back number as phone rang for quite some time and then went to a busy tone.   Verb Release not on file.*

## 2023-09-06 NOTE — TELEPHONE ENCOUNTER
Called and spoke with patient, informed her that letter was completed and available on [employer], she verified understanding and had no further questions at this time.

## 2023-09-12 ENCOUNTER — POSTPARTUM VISIT (OUTPATIENT)
Dept: OBSTETRICS AND GYNECOLOGY | Facility: CLINIC | Age: 33
End: 2023-09-12
Payer: COMMERCIAL

## 2023-09-12 VITALS
DIASTOLIC BLOOD PRESSURE: 76 MMHG | BODY MASS INDEX: 31.64 KG/M2 | RESPIRATION RATE: 14 BRPM | SYSTOLIC BLOOD PRESSURE: 118 MMHG | WEIGHT: 196 LBS

## 2023-09-12 PROCEDURE — 0503F POSTPARTUM CARE VISIT: CPT | Performed by: OBSTETRICS & GYNECOLOGY

## 2023-11-25 PROBLEM — Z97.5 IUD (INTRAUTERINE DEVICE) IN PLACE: Status: ACTIVE | Noted: 2023-11-25

## 2024-02-14 ENCOUNTER — OFFICE VISIT (OUTPATIENT)
Dept: OBSTETRICS AND GYNECOLOGY | Facility: CLINIC | Age: 34
End: 2024-02-14
Payer: COMMERCIAL

## 2024-02-14 VITALS — BODY MASS INDEX: 34.36 KG/M2 | HEIGHT: 66 IN | WEIGHT: 213.8 LBS

## 2024-02-14 DIAGNOSIS — Z30.014 ENCOUNTER FOR INITIAL PRESCRIPTION OF INTRAUTERINE CONTRACEPTIVE DEVICE (IUD): Primary | ICD-10-CM

## 2024-02-14 LAB
B-HCG UR QL: NEGATIVE
EXPIRATION DATE: NORMAL
INTERNAL NEGATIVE CONTROL: NEGATIVE
INTERNAL POSITIVE CONTROL: POSITIVE
Lab: NORMAL

## 2024-02-27 ENCOUNTER — OFFICE VISIT (OUTPATIENT)
Dept: OBSTETRICS AND GYNECOLOGY | Facility: CLINIC | Age: 34
End: 2024-02-27
Payer: COMMERCIAL

## 2024-02-27 VITALS
DIASTOLIC BLOOD PRESSURE: 90 MMHG | SYSTOLIC BLOOD PRESSURE: 130 MMHG | BODY MASS INDEX: 34.23 KG/M2 | HEIGHT: 66 IN | WEIGHT: 213 LBS

## 2024-02-27 DIAGNOSIS — Z30.014 ENCOUNTER FOR INITIAL PRESCRIPTION OF INTRAUTERINE CONTRACEPTIVE DEVICE (IUD): Primary | ICD-10-CM

## 2024-02-27 RX ORDER — IBUPROFEN 600 MG/1
600 TABLET ORAL EVERY 8 HOURS PRN
COMMUNITY
Start: 2024-01-27

## 2024-02-27 NOTE — PROGRESS NOTES
IUD Insertion    Patient's last menstrual period was 02/27/2024 (exact date).  Upt neg in office today   Date of procedure:  2/27/2024    Risks and benefits discussed? yes  All questions answered? yes  Consents given by The patient  Written consent obtained? yes    Local anesthesia used:  no    Procedure documentation:    After verifying the patient had a low probability of being pregnant and met the criteria for insertion, a sterile speculum has placed and the cervix was cleansed with an antiseptic solution.  Vaginal discharge was scant.  The anterior lip of the cervix was grasped with a tenaculum and the uterine cavity was gently sounded. There was no difficulty passing the sound through the cervix.  Cervical dilation did not need to be performed prior to placing the IUD.  The uterus was midposition and sounded to 9 cms.  The Mirena was then prepared per the manufacturers instructions.    The Mirena was advanced to a point 2 cms from the fundus and then the arms were released from the sheath.  The device was advanced to the fundus and the device was released fully from the sheath. The string was cut 3 cms in length.  Bleeding from the cervix was scant.    She tolerated the procedure without any difficulty.     Post procedure instructions: It was reviewed that the Mirena will not alter the timing of when she bleeds but it may decrease the quantity of flow and cramps.  Roughly 30% of people will be amenorrheic over time.  Efficacy rate of 99.2% over 8 years was discussed.  Spontaneous expulsion rate of 1-2% was also discussed.  If she has any issue with fever or excessive bleeding or pain she is to call the office immediately.  Otherwise I would like to see her back in 6 weeks with an ultrasound to confirm correct placement.    This note was electronically signed.  Erica Lott, LISE  February 27, 2024

## 2024-04-10 ENCOUNTER — OFFICE VISIT (OUTPATIENT)
Dept: OBSTETRICS AND GYNECOLOGY | Facility: CLINIC | Age: 34
End: 2024-04-10
Payer: COMMERCIAL

## 2024-04-10 VITALS
WEIGHT: 208 LBS | HEIGHT: 66 IN | SYSTOLIC BLOOD PRESSURE: 130 MMHG | BODY MASS INDEX: 33.43 KG/M2 | DIASTOLIC BLOOD PRESSURE: 80 MMHG

## 2024-04-10 DIAGNOSIS — Z30.431 IUD CHECK UP: Primary | ICD-10-CM

## 2024-04-10 PROCEDURE — 99212 OFFICE O/P EST SF 10 MIN: CPT | Performed by: NURSE PRACTITIONER

## 2024-04-10 NOTE — PROGRESS NOTES
"Subjective   Chief Complaint   Patient presents with    Contraception     Iud follow up after US     Nunu Morgan is a 33 y.o. year old .  Patient's last menstrual period was 2024 (approximate).  She presents to be seen for iud follow up. She had a mirena iud placed 24. She denies pain with sic, states her partner can feel the strings but this is non bothersome. She had some irregular spotting since placement but this has since resolved. Her last period was much lighter than her regular flow.       The following portions of the patient's history were reviewed and updated as appropriate:current medications and allergies    Social History    Tobacco Use      Smoking status: Never      Smokeless tobacco: Never         Objective   /80 (BP Location: Right arm, Patient Position: Sitting, Cuff Size: Adult)   Ht 167.6 cm (66\")   Wt 94.3 kg (208 lb)   LMP 2024 (Approximate) Comment: Mirena  BMI 33.57 kg/m²     Lab Review   No data reviewed    Imaging   Pelvic ultrasound report   Tvus in office today shows iud in proper placement     Assessment   Iud check     Plan   Reviewed ultrasound findings with patient, iud warning signs reviewed   The importance of keeping all planned follow-up and taking all medications as prescribed was emphasized.  Rtc for annual or prn      No orders of the defined types were placed in this encounter.         This note was electronically signed.    Erica Lott, LISE  April 10, 2024        "

## 2024-11-19 ENCOUNTER — OFFICE VISIT (OUTPATIENT)
Dept: OBSTETRICS AND GYNECOLOGY | Facility: CLINIC | Age: 34
End: 2024-11-19
Payer: COMMERCIAL

## 2024-11-19 VITALS
WEIGHT: 203 LBS | DIASTOLIC BLOOD PRESSURE: 78 MMHG | RESPIRATION RATE: 14 BRPM | BODY MASS INDEX: 32.77 KG/M2 | SYSTOLIC BLOOD PRESSURE: 128 MMHG

## 2024-11-19 DIAGNOSIS — Z01.419 WELL WOMAN EXAM WITH ROUTINE GYNECOLOGICAL EXAM: Primary | ICD-10-CM

## 2024-11-19 DIAGNOSIS — Z30.432 ENCOUNTER FOR REMOVAL OF INTRAUTERINE CONTRACEPTIVE DEVICE (IUD): ICD-10-CM

## 2024-11-19 DIAGNOSIS — Z31.69 ENCOUNTER FOR PRECONCEPTION CONSULTATION: ICD-10-CM

## 2024-11-19 PROBLEM — Z97.5 IUD (INTRAUTERINE DEVICE) IN PLACE: Status: RESOLVED | Noted: 2023-11-25 | Resolved: 2024-11-19

## 2024-11-19 NOTE — PROGRESS NOTES
Subjective   Chief Complaint   Patient presents with    Gynecologic Exam     Nunu Morgan is a 34 y.o. year old  presenting to be seen for her annual exam.  Does want to have the IUD removed because they are planning to conceive in the coming year    SEXUAL Hx:  She is currently sexually active.  In the past year there there has been NO new sexual partners.    Condoms are never used.  She would not like to be screened for STD's at today's exam.  Current birth control method: IUD - Mirena.  MENSTRUAL Hx:  Patient's last menstrual period was 2024 (approximate).  In the past 6 months her cycles have been regular, predictable and occur monthly.  Her menstrual flow is typically light.   Each month on average there are roughly 0 day(s) of very heavy flow.  Intermenstrual bleeding is absent.    Post-coital bleeding is absent.  Dysmenorrhea: is not affecting her activities of daily living  PMS: is not affecting her activities of daily living  Her cycles are not a source of concern for her that she wishes to discuss today.  HEALTH Hx:  She exercises regularly: yes.  She wears her seat belt: yes.  She has concerns about domestic violence: no.    The following portions of the patient's history were reviewed and updated as appropriate:problem list, current medications, allergies, past family history, past medical history, past social history, and past surgical history.    Social History    Tobacco Use      Smoking status: Never      Smokeless tobacco: Never    Review of Systems  Constitutional POS: nothing reported    NEG: anorexia or night sweats   Genitourinary POS: nothing reported    NEG: dysuria or hematuria      Gastointestinal POS: nothing reported    NEG: bloating, change in bowel habits, melena, or reflux symptoms   Integument POS: nothing reported    NEG: moles that are changing in size, shape, color or rashes   Breast POS: nothing reported    NEG: persistent breast lump, skin dimpling, or nipple  discharge        Objective   /78   Resp 14   Wt 92.1 kg (203 lb)   LMP 11/04/2024 (Approximate)   BMI 32.77 kg/m²     General:  well developed; well nourished  no acute distress  mentation appropriate   Skin:  No suspicious lesions seen   Thyroid: normal to inspection and palpation   Breasts:  Examined in supine position  Symmetric without masses or skin dimpling  Nipples normal without inversion, lesions or discharge  There are no palpable axillary nodes   Abdomen: soft, non-tender; no masses  no umbilical or inguinal hernias are present  no hepato-splenomegaly   Pelvis: Clinical staff was present for exam  External genitalia:  normal appearance of the external genitalia including Bartholin's and McDermitt's glands.  :  urethral meatus normal;  Vaginal:  normal pink mucosa without prolapse or lesions.  Cervix:  normal appearance. IUD string present - 2.5 cms in length;  Uterus:  normal size, shape and consistency.  Adnexa:  normal bimanual exam of the adnexa.  Rectal:  digital rectal exam not performed; anus visually normal appearing.        Assessment   Normal GYN exam  Pre-conceptional - she currently is not taking sufficient folic acid     Plan   Pap was done today.  If she does not receive the results of the Pap within 2 weeks  time, she was instructed to call to find out the results.  I explained to Nunu that the recommendations for Pap smear interval in a low risk patient's has lengthened to 3 years time.  I encouraged her to be seen yearly for a full physical exam including breast and pelvic exam even during the off years when PAP's will not be performed.  Folic acid for the prevention of neural tube defects was discussed.  At least 0.4 mg should be taken preconceptionally to reduce the risk.  It was explained that this may reduce the baseline incidence of neural tube defect by as much as 65%.  Folic acid can be found in OTC multivitamins, OTC prenatal vitamins or breakfast cereal that that  contains 100% of the RDA of folate.  I have counseled the patient on the importance of staying up to date with preventive vaccines as well as the risks and benefits of these vaccines.  Her vaccine record was reviewed and updated.  The importance of keeping all planned follow-up and taking all medications as prescribed was emphasized.  Follow up today for IUD removal            This note was electronically signed.    Marco Dobbins M.D.  November 19, 2024    Part of this note may be an electronic transcription/translation of spoken language to printed text using the Dragon Dictation System.

## 2024-11-19 NOTE — PROGRESS NOTES
IUD Removal    Date of procedure:  11/19/2024    Risks and benefits discussed? yes  All questions answered? yes  Consents given by The patient  Written consent obtained? yes  Reason for removal: Desires pregnancy    Local anesthesia used:  no    Procedure documentation:    A speculum was placed in order to view the cervix.  A tenaculum did not need to be placed on the anterior cervical lip.  Cervical dilation did not need to be performed in order to access the string.  The IUD string was easily seen.  The string was grasped and the IUD was removed without difficulty.  The IUD did not appear to be adherent to the uterine cavity. It was removed intact.    She tolerated the procedure without any difficulty.     Post procedure instructions: Patient notified to call with heavy bleeding, fever or increasing pain.    Follow up PRN      This note was electronically signed.    Marco Dobbins M.D.  November 19, 2024

## 2024-11-25 ENCOUNTER — TELEPHONE (OUTPATIENT)
Dept: OBSTETRICS AND GYNECOLOGY | Facility: CLINIC | Age: 34
End: 2024-11-25
Payer: COMMERCIAL

## 2024-11-25 LAB — REF LAB TEST METHOD: NORMAL

## 2024-11-25 NOTE — TELEPHONE ENCOUNTER
Right now not much to do other than give it some time.  If it becomes significantly heavier we can try some medicine.  Anticipate this should be self-limited and stop but if not, let us know

## 2024-11-25 NOTE — TELEPHONE ENCOUNTER
TOBY    Pt called stating that last week she got mirena removed and since then she has been experiencing heaving bleeding and clotting. Pt  does not know if this is due to her period or if this normal. She would like to know what needs to be done from here. Please advise.

## (undated) DEVICE — TBG PENCL TELESCP MEGADYNE SMOKE EVAC 10FT

## (undated) DEVICE — TRY SPINE BLCK WHITACRE 25G 3X5IN

## (undated) DEVICE — TRAP FLD MINIVAC MEGADYNE 100ML

## (undated) DEVICE — GLV SURG BIOGEL LTX PF 7 1/2

## (undated) DEVICE — SUT PDS 0 CTX 36IN DYED Z370T

## (undated) DEVICE — SUT GUT CHRM 1 CTX 36IN 905H

## (undated) DEVICE — SOL IRR NACL 0.9PCT BT 1000ML

## (undated) DEVICE — PK C/SECT 10

## (undated) DEVICE — ADHS SKIN PREMIERPRO EXOFIN TOPICAL HI/VISC .5ML

## (undated) DEVICE — APPL CHLORAPREP TINTED 26ML TEAL

## (undated) DEVICE — PATIENT RETURN ELECTRODE, SINGLE-USE, CONTACT QUALITY MONITORING, ADULT, WITH 9FT CORD, FOR PATIENTS WEIGING OVER 33LBS. (15KG): Brand: MEGADYNE

## (undated) DEVICE — 4-PORT MANIFOLD: Brand: NEPTUNE 2

## (undated) DEVICE — CLTH CLENS READYCLEANSE PERI CARE PK/5

## (undated) DEVICE — SUT VIC 3/0 CT1 27IN DYED J338H

## (undated) DEVICE — SUT VIC 2/0 CT1 27IN J339H BX/36

## (undated) DEVICE — MAT PREVALON MOBL TRANSFR AIR W/PAD REPROC 39X81IN

## (undated) DEVICE — SOL IRR H2O BTL 1000ML STRL